# Patient Record
Sex: FEMALE | Race: WHITE
[De-identification: names, ages, dates, MRNs, and addresses within clinical notes are randomized per-mention and may not be internally consistent; named-entity substitution may affect disease eponyms.]

---

## 2017-03-09 NOTE — HP
HISTORY AND PHYSICAL:

 

DATE OF ADMISSION:  03/10/17

 

ADMITTING DIAGNOSES:

1.  Left hydronephrosis.

2.  Left ureteropelvic junction calculus.

3.  Left renal calculus.

 

PLANNED PROCEDURE:  Left stent insertion (to be followed in the near future by 
lithotripsy).

 

SURGEON:  Dr. Allen.

 

ADMITTING HISTORY AND PHYSICAL:  Abhi Neff is a 67-year-old lady with a 
history of recurrent renal calculi.  She was recently evaluated for left flank 
pain and was noted to have left hydronephrosis secondary to a 6-mm calculus at 
the left ureteropelvic junction.  In addition, she has a 9-mm calculus in the 
left kidney. The plan is for left stent insertion to be followed in the near 
future by lithotripsy.  Her urinalysis when seen on March 8th was suspicious 
for urinary tract infection and she was started on antibiotics and urine 
culture is pending at the time of this dictation.

 

PAST MEDICAL HISTORY:  Significant for:

1.  Renal calculi.

2.  Hypertension.

3.  Postherpetic neuralgia.

 

MEDICATIONS:  On admission:

1.  Hydrochlorothiazide 25 mg a day.

2.  Atenolol 12.5 mg a day.

3.  Lyrica 100 mg 2 tablets daily.

4.  Duloxetine 60 mg a day.

 

ALLERGIES:  AMPICILLIN (hives), OXCARBAZEPINE (skin rash), CODEINE (vomiting).

 

                               PHYSICAL EXAMINATION

 

GENERAL:  Reveals a pleasant, middle-aged lady.

 

VITAL SIGNS:  Blood pressure is 112/78, pulse 70 per minute, temperature 98.5, 
oxygen saturation 97%.

 

LUNGS:  Clear bilaterally.

 

CARDIOVASCULAR:  Regular rate and rhythm.  S1, S2.

 

ABDOMEN:  Soft with mild left flank tenderness.

 

 IMPRESSION:  A 67-year-old lady with left ureteropelvic junction calculus with 
mild left hydronephrosis and a possible associated urinary tract infection, who 
has been started on antibiotics and is now being brought in for left stent 
insertion to be followed at some point in the near future by lithotripsy.

 

 CC:  Dr. Joe Riggs; Dr. Allen *

 

[id number]/CPS #: [id number]

MTDD

## 2017-03-10 ENCOUNTER — HOSPITAL ENCOUNTER (OUTPATIENT)
Dept: HOSPITAL 25 - OR | Age: 67
Discharge: HOME | End: 2017-03-10
Attending: UROLOGY
Payer: MEDICARE

## 2017-03-10 VITALS — DIASTOLIC BLOOD PRESSURE: 77 MMHG | SYSTOLIC BLOOD PRESSURE: 126 MMHG

## 2017-03-10 DIAGNOSIS — B02.29: ICD-10-CM

## 2017-03-10 DIAGNOSIS — I10: ICD-10-CM

## 2017-03-10 DIAGNOSIS — N13.2: Primary | ICD-10-CM

## 2017-03-10 PROCEDURE — 87086 URINE CULTURE/COLONY COUNT: CPT

## 2017-03-10 PROCEDURE — C1876 STENT, NON-COA/NON-COV W/DEL: HCPCS

## 2017-03-10 PROCEDURE — 74420 UROGRAPHY RTRGR +-KUB: CPT

## 2017-03-10 NOTE — RAD
INDICATION: Left-sided hydronephrosis, left ureteral stent insertion



COMPARISONS: KUB dated March 08, 2017



TECHNIQUE: Fluoroscopy was provided for a retrograde pyelogram and stent placement. Total

fluoroscopy time is: 10 seconds



FINDINGS: Spot images demonstrate contrast within the renal collecting system. A ureteral

stent is noted



IMPRESSION: FLUOROSCOPY WAS PROVIDED FOR A RETROGRADE PYELOGRAM AND STENT PLACEMENT



CPT II Codes: 6045F

## 2017-03-11 NOTE — OP
DATE OF OPERATION:  03/10/17 - [facility]

 

DATE OF BIRTH:  [birth year] - AGE/SEX:  67 years, female.

 

SURGEON:  Dr. Allen.

 

ANESTHESIA:  Spinal.

 

ANESTHESIOLOGIST:  Dr. Chung.

 

PRE-OP DIAGNOSES:

1.  Left hydronephrosis.

2.  Calculus, left ureteropelvic junction.

3.  Urinary tract infection.

 

POST-OP DIAGNOSES:

1.  Left hydronephrosis.

2.  Calculus, left ureteropelvic junction.

3.  Urinary tract infection.

 

OPERATIVE PROCEDURE:  Cystoscopy, left retrograde pyelogram, left ureteral 
calculus manipulation and left stent insertion.

 

COMPLICATIONS:  None.

 

STENT USED:  8-Beninese stent, left ureter.

 

POSTOPERATIVE CONDITION:  Stable.

 

OPERATIVE FINDINGS:  Mild stricture at the area of left ureteropelvic junction 
with mild left hydronephrosis.

 

INDICATIONS:  Abhi Neff is a 67-year-old lady with a history of recurrent 
left renal calculi.  She was recently evaluated for left-sided pain and noted 
to have a 6 mm calculus at the left ureteropelvic junction with left 
hydronephrosis and then associated urinary tract infection.  She is being 
brought in for left stent insertion to be followed in near future by 
lithotripsy.

 

DESCRIPTION OF PROCEDURE:  After induction of spinal anesthesia, the patient 
was placed in dorsal lithotomy position.  Sequential compression devices were 
in place and functioning.  Initial cystoscopy revealed a normal-appearing 
bladder with some mild chronic inflammatory changes noted.  Left retrograde 
pyelogram revealed mild-to-moderate fullness on the left collecting system with 
some narrowing noted at the area of the ureteropelvic junction.  The open-ended 
catheter was advanced and calculus was manipulated proximally.  Once this was 
done, an 8-Beninese stent was introduced and positioned under fluoroscopy with 
good proximal and distal positioning obtained.  Urine had been obtained from 
the left kidney and sent for culture and visibly looked clear.  The bladder was 
emptied.  The patient tolerated the procedure satisfactorily and was 
transferred back to the recovery area in stable condition.



CC:  Joe Riggs MD; Yung Allen MD *

 

 [id number]/CPS #: [id number]

MTDD

## 2017-03-13 ENCOUNTER — HOSPITAL ENCOUNTER (OUTPATIENT)
Dept: HOSPITAL 25 - OR | Age: 67
Discharge: HOME | End: 2017-03-13
Attending: UROLOGY
Payer: MEDICARE

## 2017-03-13 VITALS — DIASTOLIC BLOOD PRESSURE: 62 MMHG | SYSTOLIC BLOOD PRESSURE: 117 MMHG

## 2017-03-13 DIAGNOSIS — F32.9: ICD-10-CM

## 2017-03-13 DIAGNOSIS — N20.0: Primary | ICD-10-CM

## 2017-03-13 DIAGNOSIS — M19.90: ICD-10-CM

## 2017-03-13 DIAGNOSIS — Z88.5: ICD-10-CM

## 2017-03-13 DIAGNOSIS — Z87.442: ICD-10-CM

## 2017-03-13 DIAGNOSIS — I27.2: ICD-10-CM

## 2017-03-13 DIAGNOSIS — I10: ICD-10-CM

## 2017-03-13 DIAGNOSIS — Z88.8: ICD-10-CM

## 2017-03-13 DIAGNOSIS — Z88.0: ICD-10-CM

## 2017-03-13 PROCEDURE — 74000: CPT

## 2017-03-13 NOTE — RAD
Indication indication: Lithotripsy.



2 views of the abdomen demonstrates a left ureteral stent in place. Tiny calcifications

overlying the lower pole of the left kidney is noted. The calculi appear to BE smaller

than on previous exam of March 08, 2017.



IMPRESSION: Left ureteral stent in place.

## 2017-03-14 NOTE — OP
DATE OF OPERATION:  03/13/17 - [facility]

 

DATE OF BIRTH:  [birth year]

 

SURGEON:  Yung Allen MD

 

ANESTHESIOLOGIST:  Dr. Elmore.

 

ANESTHESIA:  Intravenous sedation.

 

PRE-OP DIAGNOSIS:  Left renal calculi.

 

POST-OP DIAGNOSIS:  Left renal calculi.

 

OPERATIVE PROCEDURE:  Shock wave lithotripsy of left renal calculi.

 

INDICATIONS:  Abhi Neff is a 67-year-old lady with a history of recurrent 
renal calculi.  She had undergone urgent stent insertion for an obstructing 
calculus at the left ureteropelvic junction and is now being brought in for 
lithotripsy.

 

COMPLICATIONS:  None.

 

POSTOPERATIVE CONDITION:  Stable.

 

DESCRIPTION OF PROCEDURE:  After induction of intravenous sedation, the patient 
was placed on the lithotripsy table in supine position.  The calculi which were 
now in the lower pole area of the left kidney were visualized using 
fluoroscopy.  Shock wave lithotripsy was commenced at a rate of 90 shocks per 
minute.  After the initial 300 shocks, there was a brief pause in lithotripsy 
for several minutes in an effort to minimize any potential trauma to the 
kidney.  Lithotripsy was then resumed and a total of 2200 shocks were 
administered.  The patient tolerated the procedure satisfactorily and was 
transferred back to the recovery area in stable condition.



CC:  Joe Riggs MD *

 

 [id number]/CPS #: [id number]

MARYBEL

## 2017-08-01 ENCOUNTER — HOSPITAL ENCOUNTER (EMERGENCY)
Dept: HOSPITAL 25 - UCEAST | Age: 67
Discharge: HOME | End: 2017-08-01
Payer: MEDICARE

## 2017-08-01 VITALS — DIASTOLIC BLOOD PRESSURE: 72 MMHG | SYSTOLIC BLOOD PRESSURE: 154 MMHG

## 2017-08-01 DIAGNOSIS — Z88.1: ICD-10-CM

## 2017-08-01 DIAGNOSIS — Z90.49: ICD-10-CM

## 2017-08-01 DIAGNOSIS — Z88.5: ICD-10-CM

## 2017-08-01 DIAGNOSIS — I10: ICD-10-CM

## 2017-08-01 DIAGNOSIS — Z87.442: ICD-10-CM

## 2017-08-01 DIAGNOSIS — J06.9: Primary | ICD-10-CM

## 2017-08-01 PROCEDURE — G0463 HOSPITAL OUTPT CLINIC VISIT: HCPCS

## 2017-08-01 PROCEDURE — 87651 STREP A DNA AMP PROBE: CPT

## 2017-08-01 PROCEDURE — 99211 OFF/OP EST MAY X REQ PHY/QHP: CPT

## 2017-08-01 NOTE — UC
Throat Pain/Nasal Sunil HPI





- HPI Summary


HPI Summary: 





ST with swollen glands, extra sweating starting 5 days ago. Since yesterday has 

had a dry cough that feels like something is stuck low in her chest. Denies 

nasal congestion, trouble breathing, or measured fever. Is about to travel to 

[place] to help her sister who will be getting hip replacement.





- History of Current Complaint


Chief Complaint: UCGeneralIllness


Stated Complaint: SORE THROAT


Time Seen by Provider: 08/01/17 12:30


Hx Obtained From: Patient


Pregnant?: No


Onset/Duration: Gradual Onset, Lasting Days


Cough: Nonproductive


Associated Signs & Symptoms: Negative: Nasal Discharge, Fever, Vomiting, Rash





- Allergies/Home Medications


Allergies/Adverse Reactions: 


 Allergies











Allergy/AdvReac Type Severity Reaction Status Date / Time


 


Ampicillin Allergy  Hives Verified 08/01/17 12:26


 


Codeine Allergy  N/V Verified 08/01/17 12:26


 


Oxcarbazepine Allergy  RASH, SKIN Verified 08/01/17 12:26





   PEELING  


 


CARBAZEPINE Allergy  RASH AND Uncoded 08/01/17 12:26





   SKIN  





   PEELING  


 


Hayfever Allergy  Runny Nose Uncoded 08/01/17 12:26














PMH/Surg Hx/FS Hx/Imm Hx


Cardiovascular History: Hypertension





- Surgical History


Surgical History: Yes


Surgery Procedure, Year, and Place: KIDNEY STONE REMOVED, 2009, [place].  

GALLBLADDER [facility], 1974.  CYSTO STENTS, [facility] 7/2009, 8/2009, 4/2012 [facility].  11/20/

15 LITHO [facility]





- Family History


Known Family History: Positive: None





- Social History


Lives: Alone


Alcohol Use: None


Substance Use Type: None


Smoking Status (MU): Never Smoked Tobacco





Review of Systems


Constitutional: Negative


Skin: Negative


Eyes: Negative


ENT: Sore Throat


Respiratory: Cough


Cardiovascular: Negative


Gastrointestinal: Negative


Genitourinary: Negative


Motor: Negative


Neurovascular: Negative


Musculoskeletal: Negative


Neurological: Negative


Psychological: Negative


All Other Systems Reviewed And Are Negative: Yes





Physical Exam


Triage Information Reviewed: Yes


Appearance: Well-Appearing, No Pain Distress, Well-Nourished


Vital Signs: 


 Initial Vital Signs











Temp  96.2 F   08/01/17 12:21


 


Pulse  62   08/01/17 12:21


 


Resp  18   08/01/17 12:21


 


BP  154/72   08/01/17 12:21


 


Pulse Ox  99   08/01/17 12:21











Vital Signs Reviewed: Yes


Eye Exam: Normal, Other - PERRL


Eyes: Positive: Conjunctiva Clear


ENT: Positive: Hearing grossly normal, Pharynx normal, TMs normal.  Negative: 

Tonsillar swelling, Tonsillar exudate


Dental Exam: Normal


Neck: Positive: Supple, Tenderness @ - over anterior lymph nodes, no palpable 

swelling noted


Respiratory Exam: Normal


Respiratory: Positive: Chest non-tender, Lungs clear, Normal breath sounds, No 

respiratory distress, No accessory muscle use


Cardiovascular Exam: Normal


Cardiovascular: Positive: RRR, No Murmur


Musculoskeletal Exam: Normal


Neurological Exam: Normal


Neurological: Positive: Alert


Psychological Exam: Normal


Skin Exam: Normal





Throat Pain/Nasal Course/Dx





- Differential Dx/Diagnosis


Provider Diagnoses: URI, likely viral





Discharge





- Discharge Plan


Condition: Stable


Disposition: HOME


Patient Education Materials:  Cold Symptoms (ED)


Referrals: 


Joe Riggs MD [Primary Care Provider] - 


Additional Instructions: 


Remember to cover your cough, wash your hands frequently, and use hand 

 when entering or leaving your sister's hospital room. Have You Had Dysport Before?: has had dysport

## 2017-12-24 NOTE — HP
PREOPERATIVE HISTORY AND PHYSICAL:

 

DATE OF SURGERY/ADMISSION:  01/02/18

 

DATE OF OFFICE VISIT/ENCOUNTER:  12/20/17

 

ATTENDING SURGEON:  Niesha Garcia MD * (DICTATED BY BRIAN MONTENEGRO

 

PROCEDURE:  Right total knee replacement.

 

CHIEF COMPLAINT:  Right knee pain.

 

HISTORY OF PRESENT ILLNESS:  Ms. Neff is a 67-year-old female who has had 
ongoing of worsening right knee pain over several years increased with 
prolonged ambulation and stair climbing.  She has had physical therapy and also 
received a cortisone injection probably a month ago and was helpful; however, 
it has not been able to fully alleviate her discomfort and pain is interfering 
with her activities of daily life.  She has consented to proceed with surgical 
inter-vention at this time in the form of a right total knee replacement.  X-
rays of the knee had showed advanced osteoarthritis.

 

PAST MEDICAL HISTORY:

1.  Postherpetic neuralgia.

2.  Morbid obesity.

3.  Hypertension.

4.  History of asthma.

5.  Osteoarthritis.

6.  Heart murmur.

7.  History of kidney stones.

 

PAST SURGICAL HISTORY:

1.  Cholecystectomy.

2.  Several percutaneous lithotripsies for kidney stones.

3.  Hammertoe surgery.

 

CURRENT MEDICATIONS:

1.  Aspirin 325 mg 1 to 2 p.o. p.r.n. headaches.

2.  Atenolol 25 mg half tab p.o. every day.

3.  Claritin 10 mg daily p.r.n.

4.  Cymbalta 30 mg 2 capsules daily.

5.  Hydrochlorothiazide 25 mg daily.

6.  Ibuprofen 400 mg q.4 to 6 hours p.r.n.

7.  Lyrica 50 mg daily p.r.n.

8.  Lyrica 100 mg one tab in the evening and one tab in the morning.

 

ALLERGIES:  AMOXICILLIN, AMPICILLIN, CARBAMAZEPINE, OXCARBAZEPINE, all of those 
medications cause hives.  CODEINE PHOSPHATE causes nausea and vomiting.  The 
patient also has hay fever.

 

FAMILY MEDICAL HISTORY:  Heart disease, hypertension, and breast cancer.

 

SOCIAL HISTORY:  The patient is employed. She has a [employer] business and she 
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  She denies 
tobacco use and recreational drug use.  She does drink alcohol on rare 
occasions.

 

REVIEW OF SYSTEMS:  General:  Positive for night sweats.  Negative for fevers 
or chills.  No known anesthesia problems.  HEENT:  Positive for occasional 
blurred vision.  Negative for headache, lightheadedness, or syncopal episodes. 
Integumentary:  Negative for abrasions, lesions, or open wounds.  Cardiothoracic
: Positive for hypertension.  Negative for chest pain, palpitations, or edema. 
Positive for heart murmur.  Pulmonary:  Positive for shortness of breath with 
exertion related to asthma.  Negative for chronic cough or COPD.  GI:  Negative 
for nausea, vomiting, diarrhea, constipation, or GERD.  :  Positive for 
history of UTIs and positive for history of kidney stones.  Negative for 
nocturia, urinary frequency or urgency.  Musculoskeletal:  Positive for current 
complaint.  Negative for chronic or intermittent back pain or history of 
fractures.  Neurologic: Positive for postherpetic neuralgia.  Negative for 
history of seizure or stroke. Endocrine:  Negative for diabetes or thyroid 
issues.  Hematologic:  Negative for easy bruising, anemia, excessive bleeding, 
or history of DVT.  Infectious Disease: Negative for history of MRSA, hepatitis 
C, or HIV.

 

                               PHYSICAL EXAMINATION

 

GENERAL:  Well-developed, well-nourished 67-year-old female in no acute 
distress.

 

VITAL SIGNS:  Height 5 feet 3 inches, weight 236 pounds, blood pressure 180/90, 
pulse rate 78.

 

HEENT:  Normocephalic, atraumatic.  Pupils are equal, round and reactive to 
light and accommodation.  Extraocular movements are intact.  Throat is clear.

 

NECK:  Supple.  No palpable lymph nodes.

 

PULMONARY:  Lungs are clear to auscultation bilaterally.  No wheezes, rales, or 
rhonchi.

 

CARDIOVASCULAR:  Regular rate and rhythm.  S1 and S2.  A mild systolic ejection 
murmur noted.  No rubs or gallops.  No edema.

 

ABDOMEN:  Positive bowel sounds, soft and nontender.

 

NEUROLOGIC:  Alert and oriented x3.  Cranial nerves II through XII are intact. 
Sensation is intact to light touch.

 

MUSCULOSKELETAL:  On exam of her extremities and her knee in particular, skin 
is intact.  She has a mild joint effusion.  Range of motion from 5 to 110 
degrees of flexion at the knee.  5/5 ankle dorsiflexion and plantar flexion 
strength.  No varus or valgus instabilities of the knee.  Full sensation to 
light touch in all nerve distributions and a 2+ palpable dorsalis pedis pulse.

 

 IMAGING STUDIES:  X-rays of the right knee show severe arthritis with medial 
bone- on-bone contact, end-stage arthritis with tricompartmental osteophyte 
formation, subchondral sclerosis, and joint space narrowing.

 

IMPRESSION:  Right knee end-stage osteoarthritis.

 

PLAN:  This is a 67-year-old female with end-stage osteoarthritis of the right 
knee.  She has failed conservative treatment and has elected to proceed with a 
right total knee arthroplasty, which is scheduled for 01/02/18 with Dr. Garcia.  
Dr. Garcia discussed the risks and benefits of surgery at today's visit and all 
of her questions were answered.  Postoperatively, she will use Coumadin for DVT 
prophylaxis, Percocet for pain management and Colace for constipation related 
to narcotic pain medication use.  All of these prescriptions were e-scribed to 
the patient's pharmacy today.  She will follow up with Dr. Garcia in 2 weeks 
after surgery.

 

 ____________________________________ BRIAN MONTENEGRO

 

[id number]/[facility] #: [id number]

MARYBEL

## 2018-01-02 ENCOUNTER — HOSPITAL ENCOUNTER (INPATIENT)
Dept: HOSPITAL 25 - AA | Age: 68
LOS: 2 days | Discharge: HOME HEALTH | DRG: 470 | End: 2018-01-04
Attending: ORTHOPAEDIC SURGERY | Admitting: ORTHOPAEDIC SURGERY
Payer: MEDICARE

## 2018-01-02 DIAGNOSIS — I34.0: ICD-10-CM

## 2018-01-02 DIAGNOSIS — Z80.3: ICD-10-CM

## 2018-01-02 DIAGNOSIS — I10: ICD-10-CM

## 2018-01-02 DIAGNOSIS — M25.761: ICD-10-CM

## 2018-01-02 DIAGNOSIS — J45.909: ICD-10-CM

## 2018-01-02 DIAGNOSIS — Z90.49: ICD-10-CM

## 2018-01-02 DIAGNOSIS — Z87.442: ICD-10-CM

## 2018-01-02 DIAGNOSIS — Z88.8: ICD-10-CM

## 2018-01-02 DIAGNOSIS — Z82.49: ICD-10-CM

## 2018-01-02 DIAGNOSIS — Z88.6: ICD-10-CM

## 2018-01-02 DIAGNOSIS — Z88.1: ICD-10-CM

## 2018-01-02 DIAGNOSIS — Z72.89: ICD-10-CM

## 2018-01-02 DIAGNOSIS — M17.11: Primary | ICD-10-CM

## 2018-01-02 DIAGNOSIS — B02.29: ICD-10-CM

## 2018-01-02 DIAGNOSIS — E66.01: ICD-10-CM

## 2018-01-02 PROCEDURE — 85610 PROTHROMBIN TIME: CPT

## 2018-01-02 PROCEDURE — 88305 TISSUE EXAM BY PATHOLOGIST: CPT

## 2018-01-02 PROCEDURE — 86803 HEPATITIS C AB TEST: CPT

## 2018-01-02 PROCEDURE — 85049 AUTOMATED PLATELET COUNT: CPT

## 2018-01-02 PROCEDURE — 0SRC0J9 REPLACEMENT OF RIGHT KNEE JOINT WITH SYNTHETIC SUBSTITUTE, CEMENTED, OPEN APPROACH: ICD-10-PCS | Performed by: ORTHOPAEDIC SURGERY

## 2018-01-02 PROCEDURE — 85014 HEMATOCRIT: CPT

## 2018-01-02 PROCEDURE — 62323 NJX INTERLAMINAR LMBR/SAC: CPT

## 2018-01-02 PROCEDURE — 85018 HEMOGLOBIN: CPT

## 2018-01-02 PROCEDURE — 80048 BASIC METABOLIC PNL TOTAL CA: CPT

## 2018-01-02 PROCEDURE — 88311 DECALCIFY TISSUE: CPT

## 2018-01-02 PROCEDURE — 36415 COLL VENOUS BLD VENIPUNCTURE: CPT

## 2018-01-02 PROCEDURE — C1776 JOINT DEVICE (IMPLANTABLE): HCPCS

## 2018-01-02 RX ADMIN — Medication ONE APPLIC: at 08:29

## 2018-01-02 RX ADMIN — ROPIVACAINE HYDROCHLORIDE SCH: 5 INJECTION, SOLUTION EPIDURAL; INFILTRATION; PERINEURAL at 17:16

## 2018-01-02 RX ADMIN — DOCUSATE SODIUM SCH MG: 100 CAPSULE, LIQUID FILLED ORAL at 21:39

## 2018-01-02 RX ADMIN — Medication ONE: at 19:29

## 2018-01-02 RX ADMIN — OXYCODONE HYDROCHLORIDE AND ACETAMINOPHEN PRN TAB: 5; 325 TABLET ORAL at 21:39

## 2018-01-02 NOTE — RAD
Indication: Right knee replacement.



2 views of the right knee demonstrates bipolar right knee arthroplasty in satisfactory

position.



IMPRESSION: Right knee arthroplasty in satisfactory position.

## 2018-01-02 NOTE — XMS REPORT
Abhi Vu

 Created on:2017



Patient:Abhi Vu

Sex:Female

:1950

External Reference #:[id number]





Demographics







 Address  [address]



   [place] [address]

 

 Home Phone  [phone]

 

 Mobile Phone  [phone]

 

 Email Address  [email or web address]

 

 Preferred Language  English

 

 Marital Status  Not  Or 

 

 [employer] Affiliation  Unknown

 

 Race  White

 

 Ethnic Group  Not  Or 









Author







 Organization  [facility]

 

 Address  [address] [address]

 

 Phone  [phone]









Support







 Name  Relationship  Address  Phone

 

 Amari Vu  Unavailable  Unavailable  [phone]









Care Team Providers







 Name  Role  Phone

 

 Celia Linn MD  Primary Care Physician  Unavailable









Payers







 Type  Date  Identification Numbers  Payment Provider  Subscriber

 

 Health Maintenance    Policy Number:  Medicare Blue Ppo  Abhi Vu



 [facility] (O)    [id number]    









 Group Number: [id number]  [address] 

 

 PayID:   SHAYY [place] [id number]









 Medigap Part B  Effective:  Policy Number:  BS Aster  Abhi WHITMORE Mcpmonicalucia



   2014  [id number]    









 Expires: 2015  PayID: [id number]   [address] [id number]









 SHAYY [place] [id number]









 Medigap Part B  Effective:  Policy Number:  BS Aster  Abhi WHITMORE Patricialucia



   2011  [id number]    









 Expires: 2013  PayID: [id number]  [place] [id number]









 SHAY[place] [id number]







Problems







 Date  Description  Provider  Status

 

 Onset: 2012  Essential hypertension  Qutaybeh S. Maghaydah, Active M.D.  

 

 Onset: 2012  Mitral valve disorder  Qutaybeh S. Maghaydah, Active M.D.  

 

 Onset: 2012  Tricuspid valve disorder,  Qutaybeh S. Maghaydah, Active



   non-rheumatic  M.DBhaskar  

 

 Onset: 2013  Electrocardiogram abnormal  Qutaybeh S. Maghaydah, Active M.D.  

 

 Onset: 2013  Chest pain  Qutaybeh S. Maghaydah,  Active



     M.D.  

 

 Onset: 2013  Right bundle branch block AND left  Qutaybeh S. Maghaydah,  
Active



   posterior fascicular block  M.D.  

 

 Onset: 01/10/2014  Heart murmur  [facility]  Active

 

 Onset: 2016  Post-herpetic trigeminal neuralgia  Velvet Hanson M.D.  
Active

 

 Onset: 2017  Localized, primary osteoarthritis  Niesha Garcia M.D.  
Active



   of the pelvic region and thigh    

 

 Onset: 2017  Localized, primary osteoarthritis  Niesha Garcia M.D.  
Active







Family History







 Date  Family Member(s)  Problem(s)  Comments

 

   General  Heart Disease  

 

   General  Hypertension  

 

   General  Cancer  

 

   Father   due to MI x3  () - age 51 yr

 

   Mother   due to Natural Causes  ()

 

   Paternal Grandfather   due to influenza  ()

 

   Paternal Grandmother   due to Influenza  ()

 

   Maternal Grandfather   due to Lung disease  ()

 

   Maternal Grandmother   due to drowned  ()







Social History







 Type  Date  Description  Comments

 

 Marital Status      

 

 Lives With    Spouse  

 

 Occupation    [employer]  

 

 Cigarette Use    Never Smoked Cigarettes  

 

 ETOH Use    Rarely consumes alcohol  

 

 Smoking    Patient has never smoked  

 

 Recreational Drug Use    Denies Drug Use  

 

 Daily Caffeine    Consumes on average 2 cups of  and ice tea



     hot tea per day  

 

 Enjoy Exercising    Enjoys exercising  Walking, water aerobics

 

 Exercise Type/Frequency    Walks daily  85390 steps per day

 

 Exercise Type/Frequency    Swims 2 times a week  Aqua deonna







Allergies, Adverse Reactions, Alerts







 Date  Description  Reaction  Status  Severity  Comments

 

 2009  Ampicillin    active    hives

 

 2009  Codeine Phosphate    active    N/V

 

 2009  Hay Fever    active    

 

 2009  Oxcarbazepine    active    rash

 

 2009  Carbamazepine    active    rash

 

 2017  Amoxicillin    active    







Medications







 Medication  Date  Status  Form  Strength  Qnty  SIG  Indications  Ordering



                 Provider

 

 Lyrica    Active  Capsules  50mg  30cap  1 tab by    Lauren        s  mouth    Cowdery,



             daily as    M.D.



             needed    

 

 Hydrochlorothiazid    Active  Tablets  25mg  90tab  Take One  I10  
Seantaybeh



         s  Tablet By    S.



             Mouth    Jose,



             Every Day    M.D.

 

 Cymbalta    Active  Caps DR  30mg  60cap  Take Two  B02.22  Velvet     Part    s  Capsules    Stackman,



             By Mouth    M.D.



             Every Day    



             as    



             Directed    

 

 Atenolol    Active  Tablets  25mg  30tab  1/2  po    Unknown



   /        s  qd    

 

 Lyrica    Active  Capsules  100mg  60cap  1 by    Lauren



   /        s  mouth    Cowdery,



             daily in    M.D.



             the    



             evening    



             and one    



             in the    



             morning    

 

 Ibuprofen    Active  Tablets  400mg    by mouth    Unknown



   /0000          every 4    



             to 6    



             hours as    



             needed    

 

 Claritin    Active  Capsules  10mg    1 by    Unknown



   /0000          mouth    



             every day    



             as needed    

 

 Aspirin    Active    325mg    1-2 po    Unknown



   /          prn    



             headache    



             (rare    



             use)    

 

                 

 

 Lyrica    Hx  Capsules  100mg  60cap  one twice    Velvet HERRERA



           s  a day    Margie,



   -              M.D.



                 

 

 Lyrica    Hx  Capsules  50mg  60cap  1 by    Velvet Bhaskar



           s  mouth per    Margie,



   -          day as    M.D.



             needed in    



   /2016          addition    



             to 100mg    



             tabs    

 

 Hydrochlorothiazid    Hx  Tablets  25mg  90tab  1 po qd    Other



         s      Ordering



   -              Provider



                 

 

 Cozaar    Hx  Tablets  50mg  60tab  1 pill by    Kailey        s  mouth    Hadley,



   -          twice per    D.O.



   11/27          day    



   /2013              

 

 Lisinopril    Hx  Tablets  5mg  30tab  1 po qd    Qutaybeh /2011        s      S.



   [facility]              [facility]



                 [facility]



                 

 

 Norvasc    Hx  Tablets  10mg  90tab  1 po qd    Qutayb        s      S.



   -              Maghaydah,



                 M.D.



                 

 

 Zyrtec    Hx  Chewtabs  10mg  30uni  1 tab po    Qutayb        ts  qd    S.



   -              Maghaydah,



                 M.D.



                 

 

 Norvasc    Hx  Tablets  5mg  30tab  1 po qd    Qutayb        s      S.



   -              Maghaydah,



                 M.D.



                 

 

 Lyrica    Hx  Capsules  150mg  60cap  1 po bid    Unknown



   /0000        s  and 1 qhs    



   -              



                 

 

 Multi Vitamin    Hx  Tablets      1 po qd    Unknown



   /0000              



   -              



                 

 

 Fish Oil  00/  Hx  Capsules  1000mg    1 po qd    Unknown



   /0000              



   -              



                 

 

 Lyrica    Hx  Capsules  150mg    1 po    Unknown



   /0000          daily in    



   -          the    



   12/29          morning    



   /2016              

 

 Cipro    Hx  Tablets  500mg  20tab  1 po qd x    Unknown



   /0000        s  3 days    



   -              



   04/10              



   /2012              

 

 Vitamin B6  /  Hx  Tablets  250mg    2 po qd    Unknown



   /              



   -              



                 

 

 Magnesium  /  Hx        1 spray    Unknown



   /          qd on    



   -          abdomen    



                 

 

 Aspir-81  /  Hx  Tablets DR  81mg    1 by    Unknown



   /0000          mouth    



   -          every day    



                 

 

 Ciprofloxacin HCL    Hx  Tablets  500mg        Husseini,



   /0000              MD García



   -              



   10/31              



   /2017              

 

 Duloxetine HCL    Hx  Caps DR  30mg    2 by    Unknown



   /0000    Part      mouth    



   -          every day    



                 

 

 Pneumococcal,Unspe    Active  Injection          Unknown



 cified                

 

 Influenza Virus    Active  Injection          Velvet HERRERA



 Vaccine  /0000              ISABELA Hanson







Medications Administered in Office







 Medication  Date  Status  Form  Strength  Qnty  SIG  Indications  Ordering



                 Provider

 

 Depomedrol    Administered  Injection          Niesha



 40MG  Chucho Garcia M.D.

 

 Depomedrol    Administered  Injection          Niesha



 40MG  Chucho Garcia M.D.







Vital Signs







 Date  Vital  Result  Comment

 

 2017  Height  63 inches  5'3"









 Weight  236.00 lb  

 

 Heart Rate  78 /min  

 

 BP Systolic Sitting  180 mmHg  

 

 BP Diastolic Sitting  90 mmHg  

 

 Body Temperature  98.5 F  

 

 BMI (Body Mass Index)  41.8 kg/m2  









 2017  Height  63 inches  5'3"









 Weight  228.00 lb  

 

 Heart Rate  72 /min  

 

 BP Systolic  138 mmHg  

 

 BP Diastolic  82 mmHg  

 

 Respiratory Rate  16 /min  

 

 Body Temperature  98.5 F  

 

 BMI (Body Mass Index)  40.4 kg/m2  









 2017  Height  63 inches  5'3"









 Weight  228.00 lb  with shoes

 

 Heart Rate  58 /min  

 

 BP Systolic Sitting  166 mmHg  LA lrg cuff

 

 BP Diastolic Sitting  104 mmHg  LA lrg cuff

 

 BMI (Body Mass Index)  40.4 kg/m2  

 

 Ejection Fraction  60%-65%  echo 01/15/13









 2017  Height  63 inches  5'3"









 Weight  225.00 lb  

 

 Heart Rate  61 /min  

 

 BP Systolic  147 mmHg  

 

 BP Diastolic  73 mmHg  

 

 BMI (Body Mass Index)  39.9 kg/m2  









 09/15/2017  Height  63 inches  5'3"









 Weight  227.00 lb  w/ shoes

 

 Heart Rate  66 /min  reg

 

 BP Systolic Sitting  150 mmHg  Rue, lg cuff

 

 BP Diastolic Sitting  94 mmHg  Rue, lg cuff

 

 Respiratory Rate  16 /min  

 

 BMI (Body Mass Index)  40.2 kg/m2  

 

 Ejection Fraction  60-65%  as of 2013 echo









 2017  Height  63 inches  5'3"









 Weight  230.00 lb  

 

 Heart Rate  68 /min  

 

 BP Systolic  156 mmHg  

 

 BP Diastolic  84 mmHg  

 

 Respiratory Rate  16 /min  

 

 Pain Level  2  

 

 BMI (Body Mass Index)  40.7 kg/m2  









 2017  Height  63 inches  5'3"









 Weight  217.00 lb  

 

 Heart Rate  66 /min  

 

 BP Systolic  142 mmHg  

 

 BP Diastolic  86 mmHg  

 

 Respiratory Rate  18 /min  

 

 Body Temperature  98.4 F  

 

 BMI (Body Mass Index)  38.4 kg/m2  









 12/15/2016  Heart Rate  61 /min  









 BP Systolic  132 mmHg  LA, large

 

 BP Diastolic  80 mmHg  LA, large

 

 BP Systolic Sitting  152 mmHg  LA, home unit

 

 BP Diastolic Sitting  78 mmHg  LA, home unit









 2016  Height  64 inches  5'4"









 Weight  222.00 lb  without shoes

 

 Heart Rate  76 /min  

 

 BP Systolic Sitting  160 mmHg  Rue reg cuff

 

 BP Diastolic Sitting  86 mmHg  Rue reg cuff

 

 BP Systolic Standing  156 mmHg  Rue reg cuff

 

 BP Diastolic Standing  80 mmHg  Rue reg cuff

 

 Respiratory Rate  17 /min  

 

 BMI (Body Mass Index)  38.1 kg/m2  









 11/10/2016  Height  64 inches  5'4"









 Weight  218.00 lb  

 

 Heart Rate  56 /min  

 

 BP Systolic Sitting  132 mmHg  

 

 BP Diastolic Sitting  86 mmHg  

 

 Respiratory Rate  14 /min  

 

 BMI (Body Mass Index)  37.4 kg/m2  









 2016  Height  64 inches  5'4"









 Weight  220.00 lb  

 

 Heart Rate  56 /min  

 

 BP Systolic Sitting  108 mmHg  

 

 BP Diastolic Sitting  70 mmHg  

 

 Respiratory Rate  14 /min  

 

 BMI (Body Mass Index)  37.8 kg/m2  









 2016  Height  64 inches  5'4"









 Weight  221.00 lb  w/o shoes

 

 Heart Rate  60 /min  

 

 BP Systolic Sitting  118 mmHg  LA lg cuff

 

 BP Diastolic Sitting  90 mmHg  LA lg cuff

 

 BMI (Body Mass Index)  37.9 kg/m2  

 

 Ejection Fraction  60-65  echo 1/15/13









 2015  Height  64 inches  5'4"









 Weight  237.00 lb  

 

 Heart Rate  70 /min  

 

 BP Systolic  146 mmHg  LA large

 

 BP Diastolic  78 mmHg  LA large

 

 BMI (Body Mass Index)  40.7 kg/m2  

 

 Ejection Fraction  60-65%  1/15/13 ECHO









 2014  Height  64 inches  5'4"









 Weight  240.25 lb  

 

 Heart Rate  64 /min  

 

 BP Systolic Sitting  144 mmHg  LA lg cuff

 

 BP Diastolic Sitting  82 mmHg  LA lg cuff

 

 Respiratory Rate  16 /min  

 

 BMI (Body Mass Index)  41.2 kg/m2  









 2013  Height  64 inches  5'4"









 Weight  228.00 lb  

 

 Heart Rate  68 /min  

 

 BP Systolic Sitting  130 mmHg  

 

 BP Diastolic Sitting  82 mmHg  

 

 Respiratory Rate  16 /min  

 

 BMI (Body Mass Index)  39.1 kg/m2  









 2013  Height  64 inches  5'4"









 Weight  247.00 lb  

 

 Heart Rate  58 /min  

 

 BP Systolic  118 mmHg  

 

 BP Diastolic  68 mmHg  

 

 BMI (Body Mass Index)  42.4 kg/m2  









 2012  Height  64 inches  5'4"









 Weight  260.00 lb  

 

 Heart Rate  70 /min  

 

 BP Systolic  128 mmHg  

 

 BP Diastolic  70 mmHg  

 

 BP Systolic Sitting  108 mmHg  pulse 80

 

 BP Diastolic Sitting  62 mmHg  pulse 80

 

 BP Systolic Standing  118 mmHg  pulse 84

 

 BP Diastolic Standing  70 mmHg  pulse 84

 

 BP Systolic Lying Down  116 mmHg  pulse 64

 

 BP Diastolic Lying Down  60 mmHg  pulse 64

 

 BMI (Body Mass Index)  44.6 kg/m2  









 04/10/2012  Height  64 inches  5'4"









 Weight  259.75 lb  

 

 Heart Rate  65 /min  

 

 BP Systolic Sitting  160 mmHg  home unit: 161/94

 

 BP Diastolic Sitting  80 mmHg  home unit: 161/94

 

 BMI (Body Mass Index)  44.6 kg/m2  









 2012  Height  64 inches  5'4"









 Weight  261.00 lb  

 

 Heart Rate  49 /min  

 

 BP Systolic Sitting  152 mmHg  

 

 BP Diastolic Sitting  90 mmHg  

 

 BMI (Body Mass Index)  44.8 kg/m2  









 2011  Height  64 inches  5'4"









 Weight  265.00 lb  

 

 Heart Rate  60 /min  

 

 BP Systolic Sitting  164 mmHg  L

 

 BP Diastolic Sitting  80 mmHg  L

 

 BMI (Body Mass Index)  45.5 kg/m2  









 2009  Height  64 inches  5'4"









 Weight  259.00 lb  

 

 Heart Rate  60 /min  

 

 BP Systolic Sitting  138 mmHg  

 

 BP Diastolic Sitting  82 mmHg  

 

 BMI (Body Mass Index)  44.5 kg/m2  









 2009  Height  64 inches  5'4"









 Weight  257.00 lb  

 

 Heart Rate  60 /min  

 

 BP Systolic Sitting  158 mmHg  L

 

 BP Diastolic Sitting  70 mmHg  L

 

 BMI (Body Mass Index)  44.1 kg/m2  









 2009  Height  64 inches  5'4"









 Weight  263.00 lb  

 

 Heart Rate  53 /min  

 

 BP Systolic Sitting  130 mmHg  

 

 BP Diastolic Sitting  80 mmHg  

 

 BP Systolic Standing  140 mmHg  

 

 BP Diastolic Standing  90 mmHg  

 

 BP Systolic Lying Down  130 mmHg  

 

 BP Diastolic Lying Down  80 mmHg  

 

 Respiratory Rate  16 /min  

 

 BMI (Body Mass Index)  45.1 kg/m2  







Results







 Test  Date  Test  Result  H/L  Range  Note

 

 Basic Metabolic Panel  2012  Sodium  136 mmol/L    135-145  









 Potassium  4.5 mmol/L    3.5-5.0  

 

 Chloride  106 mmol/L    101-111  

 

 Co2 (Carbon Dioxide)  24.0 mmol/L    22-32  

 

 Anion Gap  6.0 mmol/L    2-11  1

 

 Glucose  95 mg/dL      

 

 BUN  23 mg/dL    6-24  

 

 Creatinine  0.8 mg/dL    0.50-1.40  

 

 One Over Creatinine  1.25      

 

 BUN/Creatinine Ratio  28.8  High  8-20  

 

 Calcium  8.8 mg/dL    8.1-9.9  

 

 eGFR Non-  72.9    &gt; 60  

 

 eGFR   93.8    &gt; 60  2









 1  Anion gap measurement may be of limited value in the



   presence of any alkalosis, especially in a combined acid



   base disorder.



   .

 

 2  *******Because ethnic data is not always readily available,



   this report includes an eGFR for both -Americans and



   non- Americans.****



   The National Kidney Disease Education Program (NKDEP) does



   not endorse the use of the MDRD equation for patients that



   are not between the ages of 18 and 70, are pregnant, have



   extremes of body size, muscle mass, or nutritional status,



   or are non- or non-.



   According to the National Kidney Foundation, irrespective of



   diagnosis, the stage of the disease is based on the level of



   kidney function:



   Stage Description                      GFR(mL/min/1.73 m(2))



   1     Kidney damage with normal or decreased GFR       90



   2     Kidney damage with mild decrease in GFR          60-89



   3     Moderate decrease in GFR                         30-59



   4     Severe decrease in GFR                           15-29



   5     Kidney failure                       &lt;15 (or dialysis)







Procedures







 Date  CPT Code  Description  Status

 

 2017  61062  ECHO Transthoracic, Real-Time 2D With Doppler And Color  
Completed



     Flow  

 

 2017  45051  ECHO Transthoracic, Real-Time 2D With Doppler And Color  
Completed



     Flow  

 

 11/10/2017    Mammogram  Completed

 

 201710  Inject/Drain Joint/Bursa Major  Completed

 

 201710  Inject/Drain Joint/Bursa Major  Completed

 

 09/15/2017  99655  EKG Tracing &amp; Interpretation  Completed

 

 2016  23563  EKG Tracing &amp; Interpretation  Completed

 

 2016    Mammogram  Completed

 

 03/15/2016    Mammogram  Completed

 

 2016  59053  EKG Tracing &amp; Interpretation  Completed

 

 2015    Mammogram  Completed

 

 2015  51938  EKG Tracing &amp; Interpretation  Completed

 

 2015    Mammogram  Completed

 

 2015    Mammogram  Completed

 

 2015    Mammogram  Completed

 

 2014  93604  EKG Tracing &amp; Interpretation  Completed

 

 01/10/2014  38119  ECHO Transthoracic, Real-Time 2D With Doppler And Color  
Completed



     Flow  

 

 2013  81515  EKG Tracing &amp; Interpretation  Completed

 

 2013  94983  ECHO Transthoracic, Real-Time 2D With Doppler And Color  
Completed



     Flow  

 

 2013  02122  EKG Tracing &amp; Interpretation  Completed

 

 01/15/2013  52905  ECHO Transthoracic, Real-Time 2D With Doppler And Color  
Completed



     Flow  

 

 2013  03564  ECHO Stress Test Incl Perf Contiuous ekg Monitoring  
Completed



     W/Phys Superv  

 

 2013  51575  ECHO Transthoracic, Real-Time 2D With Doppler And Color  
Completed



     Flow  

 

 2012  40978  EKG Tracing &amp; Interpretation  Completed

 

 2012  92345  ECHO Transthoracic, Real-Time 2D With Doppler And Color  
Completed



     Flow  

 

 2011  09648  EKG Tracing &amp; Interpretation  Completed

 

 10/04/2010  72138  ECHO Transthoracic, Real-Time 2D With Doppler And Color  
Completed



     Flow  

 

 10/06/2009  31241  ECHO Stress Test Incl Perf Contiuous ekg Monitoring  
Completed



     W/Phys Superv  

 

 10/06/2009  72867  ECHO Stress Test Incl Perf Contiuous ekg Monitoring  
Completed



     W/Phys Superv  

 

 2009  83632  ECHO Transthoracic, Real-Time 2D With Doppler And Color  
Completed



     Flow  

 

 2009  72890  EKG Tracing &amp; Interpretation  Completed

 

 2009  87243  EKG, Interpretation Only  Completed

 

 2006  81894  Color Doppler  Completed

 

 2006  72591  Pulse Doppler &amp; Continuous Wave  Completed

 

 2006  20553  Echocardiogram  Completed







Encounters







 Type  Date  Location  Provider  CPT E/M  Dx

 

 Office Visit  2017  [facility]  Amelia Saini MD  32499  
N64.59



   10:00a  [facility]      

 

 Office Visit  2017  [facility]  Qutaybeh S.  [address]90  I10



   11:40a    ISABELA Garcia    









 I34.0

 

 I36.1









 Office Visit  09/15/2017  2:00p  [facility]  Qutaybeh S. haydah,  
75100  I10



       M.D.    









 I34.0

 

 I36.1

 

 I45.10









 Office Visit  2017  3:15p  [facility]  Velvet Hanson,  33502  
B02.22



     [facility]  M.D.    

 

 Office Visit  2017 10:00a  [facility]  Amelia Saini MD  
[address]  N64.59



     [facility]      

 

 Office Visit  12/15/2016  9:30a  [facility]  Nurse Visit   09685  I10

 

 Office Visit  2016  4:20p  [facility]  Qutaybdora S.  [address]69  
I34.0



     Trae Garcia M.D.    









 I45.10

 

 I10

 

 I36.1

 

 E66.9









 Office Visit  11/10/2016  9:45a  [facility]  Velvet Hanson,  80298  
B02.22



     [facility]  M.DBhaskar    

 

 Office Visit  2016  9:45a  [facility]  Velvet Hanson,  55347  
B02.22



     [facility]  M.D.    

 

 Office Visit  2016  1:40p  [facility]  Qutaybeh S.  06086  I34.0



       ISABELA Garcia    









 I45.2

 

 I10

 

 R94.31









 Office Visit  2015  3:40p  [facility]  Qutaybeh S. Maghaydah,  
11277  424.0



       M.DBhaskar    









 426.51

 

 401.9

 

 794.31









 Office Visit  2014  2:40p  [facility]  Qutaybeh S. haydah,  
08285  424.0



       M.DBhaskar    









 426.51

 

 401.9

 

 794.31









 Office Visit  2013 10:20a  [facility]  Qutaybeh S. Jose,  
22831  401.9



       M.D.    









 794.31

 

 424.0

 

 424.2

 

 426.51

 

 785.2









 Office Visit  2013  9:40a  [facility]  Seantaybdora S. Johnathanydah,  
71337  401.9



       M.D.    









 794.31

 

 424.0

 

 424.2

 

 426.51









 Office Visit  2013  3:30p  [facility]  Seantaybdora S. Faribaah,  
83523  401.9



       M.D.    









 794.31

 

 786.50









 Office Visit  2012  1:30p  [facility]  Lauren Valenzuela, N.P.  
93100  401.9

 

 Office Visit  04/10/2012 10:00a  [facility]  Lauren Bianca, N.P.  
46946  401.9

 

 Office Visit  2012 10:00a  [facility]  Qutaybeh S. Jose,  
47016  401.9



       M.D.    









 424.0

 

 794.31

 

 424.2









 Office Visit  2011  8:15a  [facility]  Nurse Visit cc  50498  
401.9

 

 Office Visit  2011  3:40p  [facility]  Seantaybdora S. Johnathanydah,  
96844  401.9



       M.D.    









 424.0

 

 794.31









 Office Visit  2009 10:30a  [facility]  Nurse Visit cc  19059  

 

 Office Visit  2009  9:20a  [facility]  Seantaybdora S. Jose,  
01903  401.9



       M.D.    









 424.0

 

 424.2









 Office Visit  2009  2:20p  [facility]  Qutayb S. Jose,  
48824  401.9



       M.D.    









 785.2

 

 424.0

 

 424.2

 

 780.4

 

 V72.81

 

 794.31







Plan of Care

Future Appointment(s):2018 10:45 am - Niesha Garcia M.D. at [facility]2018  9:30 am - Niesha [facility] M.D. at [facility]2018 10:45 am - Velvet Hanson M.D. at [facility]2017 - Niesha Garcia M.D.M17.0 Bilateral 
primary osteoarthritis of kneeFollow up:Follow up:   2 weeks post o pM25.561 
Pain in right knee

## 2018-01-03 LAB
HCT VFR BLD AUTO: 31 % (ref 35–47)
HGB BLD-MCNC: 10.5 G/DL (ref 12–16)
INR PPP/BLD: 1.1 (ref 0.77–1.02)
PLATELET # BLD AUTO: 207 10^3/UL (ref 150–450)

## 2018-01-03 RX ADMIN — OXYCODONE HYDROCHLORIDE AND ACETAMINOPHEN PRN TAB: 5; 325 TABLET ORAL at 01:23

## 2018-01-03 RX ADMIN — ATENOLOL SCH MG: 25 TABLET ORAL at 08:46

## 2018-01-03 RX ADMIN — DOCUSATE SODIUM SCH MG: 100 CAPSULE, LIQUID FILLED ORAL at 08:47

## 2018-01-03 RX ADMIN — OXYCODONE HYDROCHLORIDE AND ACETAMINOPHEN PRN TAB: 5; 325 TABLET ORAL at 22:23

## 2018-01-03 RX ADMIN — ENOXAPARIN SODIUM SCH MG: 30 INJECTION SUBCUTANEOUS at 12:05

## 2018-01-03 RX ADMIN — OXYCODONE HYDROCHLORIDE PRN MG: 5 CAPSULE ORAL at 10:43

## 2018-01-03 RX ADMIN — DOCUSATE SODIUM SCH MG: 100 CAPSULE, LIQUID FILLED ORAL at 22:23

## 2018-01-03 RX ADMIN — OXYCODONE HYDROCHLORIDE AND ACETAMINOPHEN PRN TAB: 5; 325 TABLET ORAL at 12:55

## 2018-01-03 RX ADMIN — OXYCODONE HYDROCHLORIDE AND ACETAMINOPHEN PRN TAB: 5; 325 TABLET ORAL at 17:59

## 2018-01-03 RX ADMIN — MAGNESIUM HYDROXIDE PRN ML: 400 SUSPENSION ORAL at 18:01

## 2018-01-03 RX ADMIN — OXYCODONE HYDROCHLORIDE AND ACETAMINOPHEN PRN TAB: 5; 325 TABLET ORAL at 08:47

## 2018-01-03 RX ADMIN — DULOXETINE HYDROCHLORIDE SCH MG: 60 CAPSULE, DELAYED RELEASE ORAL at 07:22

## 2018-01-03 RX ADMIN — PREGABALIN SCH MG: 100 CAPSULE ORAL at 22:21

## 2018-01-03 RX ADMIN — PREGABALIN SCH MG: 100 CAPSULE ORAL at 07:22

## 2018-01-03 RX ADMIN — OXYCODONE HYDROCHLORIDE PRN MG: 5 CAPSULE ORAL at 15:00

## 2018-01-03 RX ADMIN — ROPIVACAINE HYDROCHLORIDE SCH: 5 INJECTION, SOLUTION EPIDURAL; INFILTRATION; PERINEURAL at 10:45

## 2018-01-03 RX ADMIN — HYDROCHLOROTHIAZIDE SCH MG: 25 TABLET ORAL at 08:46

## 2018-01-03 NOTE — PN
Progress Note





- Progress Note


Date of Service: 01/03/18


SOAP: 


Subjective:


Pt. is alert, c/o moderate pain r knee.  








Objective:


RLE - drain removed, tip intact, 400 cc ss drainage.  calf soft, distally +df/pf

, full sens lt, 2+ dp pulse.  





Vital Signs:











Temp Pulse Resp BP Pulse Ox


 


 98.4 F   56   18   110/44   94 


 


 01/03/18 03:24  01/03/18 03:24  01/03/18 07:22  01/03/18 03:24  01/03/18 03:24








 Laboratory Results - last 24 hr











  01/02/18





  08:42


 


Hepatitis C Antibody  Nonreactive

















Assessment:


66 yo F pod 1 s/p RTKA








Plan:


wbat rle 


pt/ot


labs pending 


lovenox and coumadin today

## 2018-01-03 NOTE — OP
OPERATIVE REPORT:

 

DATE OF OPERATION:  01/02/18

 

DATE OF BIRTH:  [birth year]

 

ATTENDING SURGEON:  Niesha Garcia MD

 

ASSISTANT:  BRIAN Osullivan

 

Mr. Mehta did help throughout the procedure with preparation of the leg, wound retraction, manipulat
ion of the knee, and wound closure.

 

ANESTHESIOLOGIST:  Dr. Nieto.

 

ANESTHESIA:  Spinal.

 

PRE-OP DIAGNOSIS:  Severe end-stage degenerative osteoarthritis of the right knee joint.

 

POST-OP DIAGNOSIS:  Severe end-stage degenerative osteoarthritis of the right knee joint.

 

OPERATIVE PROCEDURE:  Right total knee arthroplasty.

 

TOURNIQUET:  63 minutes.

 

ESTIMATED BLOOD LOSS:  200 cc.

 

DRAIN:  Medium Hemovac drain.

 

COMPLICATIONS:  None.

 

SPECIMEN:  Bone and cartilage of the right knee joint sent to Pathology.

 

HARDWARE USED:  This is cemented [employer]hew total knee arthroplasty hardware. Two packages of S
implex bone cement were used.  For the femur, a size 5 right posterior stabilized Legion Oxinium femo
ral component with a tibia size 3 right tibial baseplate Miranda II.  For the insert, a 9-mm posterio
r stabilized articular insert, size 3-4 and for the patella, a 32mm 3-peg all poly patella.

 

BRIEF HISTORY/INDICATION:  Ms. Neff is a 67-year-old female with years of increasingly severe r
ight knee pain.  Radiographs showed severe end-stage arthritis with wear of the medial tibial plateau
 due to chronic degeneration.  She failed conservative treatment with anti-inflammatories, pain medic
ation, intra-articular injection, and physical therapy.  She failed to lose weight and wished to proc
eed with right total knee arthroplasty due to continued pain and decreased quality of life.

 

Informed consent was obtained from the patient.  She understood the risks of surgery, included but we
re not limited to bleeding, infection, damage to nearby structures, continued pain, need for further 
surgery, intraoperative fracture, nerve palsy, hardware failure or loosening, knee stiffness, loss of
 motion, stroke, heart attack, blood clot, and death.  She wished to proceed.

 

INTRAOPERATIVE FINDINGS:  Intraoperatively, the patient was noted to have severe wear of the medial t
ibial plateau due to chronic degenerative osteoarthritis.  She had extensive osteophyte formation, brasher
bchondral sclerosis and complete loss of cartilage in all 3 compartments.  Preoperatively, the patien
t had 15 degrees of flexion contracture.

 

DESCRIPTION OF PROCEDURE:  Ms. Neff was identified in the preanesthesia unit. Her right lower e
xtremity was marked as the correct operative side.  Informed consent was signed and placed in the [facility]
rt.  The patient was taken to the operating room and placed under spinal anesthesia.  A Noguera cathete
r was placed.  Tourniquet was placed on the right thigh.  Right lower extremity was prepped and drape
d in the usual sterile fashion.  Preop time-out was made to correctly identify the patient's side and
 site.  Appropriate perioperative antibiotics were given within one hour of incision.

 

Tourniquet was inflated and total tourniquet time for this procedure was 63 minutes.  A 14-cm midline
 incision was made with a 10 blade and carried down through the subcutaneous fat, which was at least 
6 cm thick.  Morbid obesity did add time and complexity to this case.  A new 10-blade was used to riccardo
e a standard medial parapatellar arthrotomy.  The patella was subluxed laterally and was noted to hav
e extreme wear.  Electrocautery was used to subperiosteally elevate soft tissue off the superomedial 
tibia.  Medial osteophyte was carefully removed with a rongeur.  The knee was flexed up.  ACL was not
 present.  The anterior horn of the lateral meniscus was released.  A drill was used to enter the dis
jose femur. Intramedullary distal femoral cutting guide was placed and the distal 9 mm cut was made wi
th an oscillating saw.  The external rotation guide was pinned on the distal femur.  Distal femur was
 sized to a size 5.  Size 5 multi-cutting jig was placed on the distal femur.  Oscillating saw was us
ed to make the appropriate 4 chamfer cuts. The PCL was completely released.  The tibia was subluxed a
nteriorly.  The extramedullary proximal tibial cutting guide was pinned on the proximal tibia. Oscill
ating saw was used to make a proximal tibial cut perpendicular to the mechanical axis of the tibia.  
Medial tibial plateau was noted to have sclerosis with chronic wear.  The knee was brought out into f
ull extension.  There was good medial and lateral ligamentous balancing.  The knee was in full extens
ion.  Flexion and extension gaps were well balanced.

 

The knee was flexed up.  Lamina  was placed both medially and laterally. Any remaining menisc
us was carefully removed using electrocautery.  Posterior osteophytes were removed using a curved ost
eotome.  Tibial tray and drop vicki once again confirm satisfactory proximal tibial cut.

 

A size 5 right femoral trial was impacted on to the distal femur and had good fit. The box for the po
sterior stabilized implant was prepared using a reamer and box- cut osteotome.  A size 3 tibial tray 
with a 9-mm insert trial was placed and the knee was taken through a range of motion.  The knee had f
ull extension to 120 degrees of flexion.  Flexion was limited by body habitus.  Patellofemoral tracki
ng was satisfactory.  The knee was brought out into extension.  The patella was everted.  The lateral
 patellar facet had extreme wear.  Oscillating saw was used to remove the 8 mm of patellar bone and c
artilage.  This was largely removed from the medial patellar facet.  Patella was sized to size 32.  T
hree peg holes were drilled through the size 32 guide.  A trial 32 patella was placed and the knee wa
s taken through a range of motion.  There was satisfactory patellofemoral tracking.  All trials were 
carefully removed.  The tibia was subluxed anteriorly and sized to a size 3.  Proximal tibia was prep
ared using a size 3 keel punch.  All bony cut surfaces were copiously irrigated with sterile saline a
nd dried.  Final implants were cemented into place starting with the tibia, followed by the femur and
 last the patella.  A 9-mm insert trial was placed while the knee was brought out into full extension
.  The cement was allowed to fully cure and the tourniquet was turned down at 63 minutes.  The knee w
as copiously irrigated with sterile saline.

 

Once the cement had fully cured, the insert trial was removed.  Any excess cement was removed from ar
ound the implant capsule.  Electrocautery was used to obtain meticulous hemostasis.  Final insert cho
sen was a 9-mm posterior stabilized articular insert size 3-4.  This was locked into position on the 
tibial tray without difficulty.  Stability of the insert was checked and rechecked and noted to be st
able.

 

The knee was copiously irrigated with sterile saline.  Extensor mechanism was closed over a medium He
movac drain using an interrupted #1 Vicryls.  The rest of the incision was closed in a layered fashio
n using 0 and 2-0 Vicryls.  Skin was closed using running 3-0 nylon suture.  Sterile Xeroform, 4x4s, 
and Webril were used to cover the incision.  Ace wrap and cold packs were placed over this.  The alba
ent's anesthesia was reversed without difficulty.  She was taken to the PACU in stable condition.  In
tended weightbearing will be weightbearing as tolerated. Intended DVT prophylaxis will be Coumadin wi
th a Lovenox bridge.

 

 [id number]/[facility] #: [id number]

## 2018-01-04 VITALS — SYSTOLIC BLOOD PRESSURE: 166 MMHG | DIASTOLIC BLOOD PRESSURE: 60 MMHG

## 2018-01-04 LAB
HCT VFR BLD AUTO: 30 % (ref 35–47)
HGB BLD-MCNC: 9.9 G/DL (ref 12–16)
INR PPP/BLD: 1.27 (ref 0.77–1.02)
PLATELET # BLD AUTO: 165 10^3/UL (ref 150–450)

## 2018-01-04 RX ADMIN — OXYCODONE HYDROCHLORIDE PRN MG: 5 CAPSULE ORAL at 11:44

## 2018-01-04 RX ADMIN — OXYCODONE HYDROCHLORIDE PRN MG: 5 CAPSULE ORAL at 01:11

## 2018-01-04 RX ADMIN — PREGABALIN SCH MG: 100 CAPSULE ORAL at 08:03

## 2018-01-04 RX ADMIN — ATENOLOL SCH MG: 25 TABLET ORAL at 08:46

## 2018-01-04 RX ADMIN — ENOXAPARIN SODIUM SCH MG: 30 INJECTION SUBCUTANEOUS at 11:40

## 2018-01-04 RX ADMIN — DOCUSATE SODIUM SCH MG: 100 CAPSULE, LIQUID FILLED ORAL at 08:46

## 2018-01-04 RX ADMIN — DULOXETINE HYDROCHLORIDE SCH MG: 60 CAPSULE, DELAYED RELEASE ORAL at 08:03

## 2018-01-04 RX ADMIN — HYDROCHLOROTHIAZIDE SCH MG: 25 TABLET ORAL at 08:46

## 2018-01-04 RX ADMIN — MAGNESIUM HYDROXIDE PRN ML: 400 SUSPENSION ORAL at 08:47

## 2018-01-04 RX ADMIN — OXYCODONE HYDROCHLORIDE AND ACETAMINOPHEN PRN TAB: 5; 325 TABLET ORAL at 08:46

## 2018-01-04 NOTE — PN
Progress Note





- Progress Note


Date of Service: 01/04/18


SOAP: 


Subjective:


[]Patient seen at bedside. She feels well and desires DC. No CP, SOB, 

dizziness. Op site pain is well controlled.





Objective:


[]General: NAD, calm and cooperative


RLE: Dressing changed by Dr Garcia this morning without complication. DF/PF 

intact. DP 2+


BL LE: calves supple and nontender without erythema, edema or palpable cords


 Vital Signs











Temp  97.0 F   01/04/18 11:08


 


Pulse  82   01/04/18 11:39


 


Resp  18   01/04/18 11:44


 


BP  166/60   01/04/18 11:08


 


Pulse Ox  92   01/04/18 11:39








 Intake & Output











 01/03/18 01/04/18 01/04/18





 18:59 06:59 18:59


 


Intake Total 2532 640 360


 


Output Total 700 2275 900


 


Balance 1832 -1635 -540


 


Intake:   


 


  IV Fluids 1716  


 


    LR 1716  


 


  IVPB 176  


 


      


 


  Oral 640 640 360


 


Output:   


 


  Urine 700 2275 900








 Laboratory Last Values











Hgb  9.9 g/dl (12.0-16.0)  L  01/04/18  04:58    


 


Hct  30 % (35-47)  L  01/04/18  04:58    


 


Plt Count  165 10^3/ul (150-450)   01/04/18  04:58    


 


MPV  8 um3 (7.4-10.4)   01/04/18  04:58    


 


INR (Anticoag Therapy)  1.27  (0.77-1.02)  H  01/04/18  04:59    


 


Sodium  134 mmol/L (133-145)   01/03/18  08:49    


 


Potassium  3.9 mmol/L (3.5-5.0)   01/03/18  08:49    


 


Chloride  100 mmol/L (101-111)  L  01/03/18  08:49    


 


Carbon Dioxide  29 mmol/L (22-32)   01/03/18  08:49    


 


Anion Gap  5 mmol/L (2-11)   01/03/18  08:49    


 


BUN  20 mg/dL (6-24)   01/03/18  08:49    


 


Creatinine  0.73 mg/dL (0.51-0.95)   01/03/18  08:49    


 


Est GFR ( Amer)  102.6  (>60)   01/03/18  08:49    


 


Est GFR (Non-Af Amer)  79.8  (>60)   01/03/18  08:49    


 


BUN/Creatinine Ratio  27.4  (8-20)  H  01/03/18  08:49    


 


Glucose  126 mg/dL ()  H  01/03/18  08:49    


 


Calcium  8.7 mg/dL (8.6-10.3)   01/03/18  08:49    


 


Hepatitis C Antibody  Nonreactive  (Nonreactive)   01/02/18  08:42    














Assessment:


[] POD2 SP right total knee arthoplasty








Plan:


[]WBAT


PT OT


6 mg coumadin today


DC home

## 2018-01-05 NOTE — DS
DISCHARGE SUMMARY:

 

DATE OF ADMISSION:  01/02/18

 

DATE OF SURGERY:  01/02/18

 

DATE OF DISCHARGE/SERVICE:  01/04/18

 

PROVIDER/SURGEON:  Niesha Garcia MD * (DICTATED BY BRIAN QUINTERO)

 

ASSISTANT:  BRIAN Osullivan

 

PREOPERATIVE DIAGNOSIS:  Severe end-stage degenerative osteoarthritis of the 
right knee joint.

 

OPERATIVE PROCEDURE:  Right total knee arthroplasty.

 

HISTORY:  Ms. Neff is a 67-year-old female with years of increasingly 
severe right knee pain.  Radiographs show end-stage arthritis and wear of the 
medial tibial plateau due to chronic degeneration.  She failed conservative 
treatment with anti-inflammatories, pain medications, intraarticular injection, 
and physical therapy.  She failed to lose weight and wished to proceed with 
right total knee arthroplasty.

 

HOSPITAL COURSE:  Postop day 1, right lower extremity drain was removed with 
the tip intact.  Calf was soft.  Dorsiflexion and plantarflexion intact.  
Sensation to light touch intact.  2+ dorsalis pedis pulse.  At this time, labs 
showed hemoglobin 10.5, hematocrit 31, INR 1.10.  Postop day 2, dressing was 
changed by Dr. Garcia without complication.  Dorsiflexion and plantarflexion 
intact.  Dorsalis pedis 2+. Labs; hemoglobin 9.9, hematocrit 30, INR 1.27.  The 
patient was deemed to be orthopedically and medically stable for discharge.

 

DISCHARGE MEDICATIONS:

1.  Lyrica 50 mg p.o. 1 p.r.n.

2.  Atenolol 12.5 mg p.o. q.a.m.

3.  Ibuprofen, discontinued.

4.  Lyrica 100 mg p.o. b.i.d.

5.  Cymbalta 120 mg p.o. q.a.m.

6.  Hydrochlorothiazide 25 mg p.o. q.a.m.

7.  Acetaminophen 650 mg p.o. q.4 hours p.r.n., max daily dose of 4000.

8.  Docusate 100 mg p.o. t.i.d.

9.  Oxycodone/acetaminophen 5/325 mg 1 to 2 tabs p.o. q.4 hours p.r.n., max 
daily dose of 10.

10.  Warfarin 2 mg tabs take 1 to 3 tablets depending on INR draws.

 

DISCHARGE PLAN:  Weightbearing as tolerated.  Go to the emergency room with 
shortness of breath or chest pain.  Call orthopedic office for increased 
drainage, redness, increased pain or fever.  Home nurse will draw INR on 
Mondays and Thursdays.  Coumadin dosing 6 mg on 01/04/18; 2 mg on 01/05/18, 4 
mg on 01/06/18; 2 mg on 01/07/18.  Recheck INR on 01/08/18.  Pain control with 
Percocet 5/325 one to two tabs every 4 to 6 hours p.r.n., max of 10 daily.  Do 
not exceed 4000 mg of Tylenol from all sources per day.

 

____________________________________ BRIAN QUINTERO

 

[id number]/[facility] #: [id number]

MTDD

## 2018-10-26 NOTE — HP
HISTORY AND PHYSICAL:

 

DATE OF ADMISSION/SURGERY:  11/08/18

 

DATE OF OFFICE VISIT:  10/26/18

 

SURGEON:  Niesha Garcia MD * (DICTATED BY BRIAN MILAN)

 

PROCEDURE:  Left total knee arthroplasty.

 

PRIMARY CARE PHYSICIAN:  Celia Linn MD

 

CHIEF COMPLAINT:  Left knee pain.

 

HISTORY OF PRESENT ILLNESS: Ms. Neff is a 68-year-old female with end-
stage osteoarthritis of the left knee.  She has failed conservative treatment 
and elected to proceed with a left total knee arthroplasty, which is scheduled 
for 

11/08/18.

 

PAST MEDICAL HISTORY:

1.  Hypertension.

2.  Depression.

3.  Polymyalgia rheumatica.

4.  Mitral valve prolapse.

 

PAST SURGICAL HISTORY:

1.  Lithotripsy.

2.  Cholecystectomy.

3.  Appendectomy.

4.  Right foot surgery.

5.  [place] teeth extraction.

6.  Right total knee arthroplasty.

 

CURRENT MEDICATIONS:

1.  Lyrica 50 mg as needed.

2.  Hydrochlorothiazide 25 mg daily.

3.  Cymbalta 30 mg 2 capsules daily.

4.  Atenolol 25 mg half a tab daily.

5.  Lyrica 100 mg daily.

6.  Ibuprofen as needed.

7.  Daily multivitamin.

 

ALLERGIES:  AMPICILLIN, CODEINE causing vomiting, hay fever, OXCARBAZEPINE, 
CARBAMAZEPINE.

 

FAMILY HISTORY:  Coronary artery disease and cancer.

 

SOCIAL HISTORY:  She is a 68-year-old female, lives with her .  She does 
not smoke or use drugs.  Uses alcohol rarely.

 

REVIEW OF SYSTEMS:  A complete 14-point review of systems was reviewed with the 
patient.  It was positive for history of kidney stones requiring lithotripsy.  
She denies history of DVT, PE, hepatitis, HIV, or anesthesia problems.

 

                               PHYSICAL EXAMINATION

 

GENERAL:  She is well developed, well nourished, in no acute distress.

 

VITAL SIGNS:  She stands 62 inches tall, weighs 228 pounds.  Her blood pressure 
is 147/78, her heart rate is 82.

 

HEENT:  Normocephalic, atraumatic.

 

NECK:  Supple.  No palpable lymph nodes.

 

PULMONARY:  The lungs are clear to auscultation bilaterally.

 

CARDIO:  Regular rate and rhythm.  Strong S1, S2.

 

ABDOMEN:  Soft, nontender, nondistended.

 

NEUROLOGICAL:  She is alert and oriented x3.

 

MUSCULOSKELETAL:  Left lower extremity, the skin is intact.  There are no open 
wounds or abrasions.  She has a moderate joint effusion, some tenderness over 
the medial and lateral joint line.  She walks with an antalgic type gait 
favoring her left knee.  She has a 2+ dorsalis pedis pulse, intact sensation to 
her lower extremity, muscle group strengths are intact at 5/5.

 

 ASSESSMENT AND PLAN:  Ms. Neff is a 68-year-old female with continued 
complaints of left knee pain secondary to end-stage osteoarthritis.  She has 
failed conservative treatment and elected to proceed with a left total knee 
arthroplasty. The surgery is scheduled for 11/08/18 with Dr. Garcia.  Dr. Garcia 
discussed the risks and the benefits of the surgery at today's visit and all of 
her questions were answered.  She will follow up with Dr. Garcia 2 weeks after 
the surgery.

 

 ____________________________________ BRIAN MILAN

 

[id number]/[facility] #: [id number]

MTDD

## 2018-11-08 ENCOUNTER — HOSPITAL ENCOUNTER (INPATIENT)
Dept: HOSPITAL 25 - AA | Age: 68
LOS: 1 days | Discharge: HOME HEALTH | DRG: 470 | End: 2018-11-09
Attending: ORTHOPAEDIC SURGERY | Admitting: ORTHOPAEDIC SURGERY
Payer: MEDICARE

## 2018-11-08 DIAGNOSIS — I10: ICD-10-CM

## 2018-11-08 DIAGNOSIS — Z82.49: ICD-10-CM

## 2018-11-08 DIAGNOSIS — Z90.49: ICD-10-CM

## 2018-11-08 DIAGNOSIS — I34.1: ICD-10-CM

## 2018-11-08 DIAGNOSIS — Z88.5: ICD-10-CM

## 2018-11-08 DIAGNOSIS — B02.22: ICD-10-CM

## 2018-11-08 DIAGNOSIS — J45.909: ICD-10-CM

## 2018-11-08 DIAGNOSIS — M35.3: ICD-10-CM

## 2018-11-08 DIAGNOSIS — M17.12: Primary | ICD-10-CM

## 2018-11-08 DIAGNOSIS — M25.762: ICD-10-CM

## 2018-11-08 DIAGNOSIS — Z88.1: ICD-10-CM

## 2018-11-08 DIAGNOSIS — Z88.0: ICD-10-CM

## 2018-11-08 DIAGNOSIS — D64.9: ICD-10-CM

## 2018-11-08 DIAGNOSIS — F32.9: ICD-10-CM

## 2018-11-08 DIAGNOSIS — E66.9: ICD-10-CM

## 2018-11-08 DIAGNOSIS — I36.1: ICD-10-CM

## 2018-11-08 DIAGNOSIS — Z83.511: ICD-10-CM

## 2018-11-08 DIAGNOSIS — Z83.79: ICD-10-CM

## 2018-11-08 DIAGNOSIS — Z87.442: ICD-10-CM

## 2018-11-08 DIAGNOSIS — Z96.651: ICD-10-CM

## 2018-11-08 DIAGNOSIS — Z80.3: ICD-10-CM

## 2018-11-08 DIAGNOSIS — M25.462: ICD-10-CM

## 2018-11-08 DIAGNOSIS — I77.819: ICD-10-CM

## 2018-11-08 DIAGNOSIS — I45.2: ICD-10-CM

## 2018-11-08 PROCEDURE — 80048 BASIC METABOLIC PNL TOTAL CA: CPT

## 2018-11-08 PROCEDURE — 85014 HEMATOCRIT: CPT

## 2018-11-08 PROCEDURE — 85610 PROTHROMBIN TIME: CPT

## 2018-11-08 PROCEDURE — 36415 COLL VENOUS BLD VENIPUNCTURE: CPT

## 2018-11-08 PROCEDURE — C1776 JOINT DEVICE (IMPLANTABLE): HCPCS

## 2018-11-08 PROCEDURE — 85049 AUTOMATED PLATELET COUNT: CPT

## 2018-11-08 PROCEDURE — 0SRD069 REPLACEMENT OF LEFT KNEE JOINT WITH OXIDIZED ZIRCONIUM ON POLYETHYLENE SYNTHETIC SUBSTITUTE, CEMENTED, OPEN APPROACH: ICD-10-PCS | Performed by: ORTHOPAEDIC SURGERY

## 2018-11-08 PROCEDURE — 85018 HEMOGLOBIN: CPT

## 2018-11-08 RX ADMIN — MAGNESIUM HYDROXIDE SCH ML: 400 SUSPENSION ORAL at 19:19

## 2018-11-08 RX ADMIN — DOCUSATE SODIUM SCH MG: 100 CAPSULE, LIQUID FILLED ORAL at 19:19

## 2018-11-08 RX ADMIN — CEFAZOLIN SODIUM SCH MLS/HR: 1 SOLUTION INTRAVENOUS at 21:15

## 2018-11-08 RX ADMIN — PREGABALIN SCH MG: 100 CAPSULE ORAL at 19:19

## 2018-11-08 RX ADMIN — OXYCODONE HYDROCHLORIDE AND ACETAMINOPHEN PRN TAB: 5; 325 TABLET ORAL at 19:19

## 2018-11-08 NOTE — XMS REPORT
Abhi Vu

 Created on:2018



Patient:Abhi Vu

Sex:Female

:1950

External Reference #:[id number]





Demographics







 Address  [address]



   [place] [address]

 

 Home Phone  [phone]

 

 Mobile Phone  [phone]

 

 Email Address  [email or web address]

 

 Preferred Language  English

 

 Marital Status  Not  Or 

 

 Denominational Affiliation  Unknown

 

 Race  White

 

 Ethnic Group  Not  Or 









Author







 Organization  [employer]

 

 Address  [address]



   [place] [address]

 

 Phone  [phone]









Support







 Name  Relationship  Address  Phone

 

 Amari Vu  Unavailable  Unavailable  [phone]









Care Team Providers







 Name  Role  Phone

 

 Celia Linn MD  Primary Care Physician  Unavailable









Payers







 Type  Date  Identification Numbers  Payment Provider  Subscriber

 

 Health Maintenance    Policy Number:  Medicare Blue Ppo  Abhi Vu



 [facility] ([facility])    [id number]    









 Group Number: [id number]  [address] 

 

 PayID:   SHAYY Saldaña [id number]









 Medigap Part B  Effective:  Policy Number:  FRANKO Rodarte  Abhi WHITMORE Patricialucia



   2014  [id number]    









 Expires: 2015  PayID: [id number]  [facility] [id number]









 SHAYY Saldaña [id number]









 Medigap Part B  Effective:  Policy Number:  FRANKO Rodarte  Abhi WHITMORE Patricialucia



   2011  [id number]    









 Expires: 2013  PayID: [id number]  [place] [id number]









 SHAYY Saldaña [id number]







Problems







 Date  Description  Provider  Status

 

 Onset: 2012  Essential hypertension  Qutaybeh S. Maghaydah, Active M.D.  

 

 Onset: 2012  Mitral valve disorder  Qutaybeh S. Maghaydah, Active M.D.  

 

 Onset: 2012  Tricuspid valve disorder,  Qutaybeh S. Maghaydah,  Active



   non-rheumatic  M.DBhaskar  

 

 Onset: 2013  Electrocardiogram abnormal  Qutaybeh S. Maghaydah, Active M.D.  

 

 Onset: 2013  Chest pain  Qutaybeh S. Maghaydah,  Active



     M.D.  

 

 Onset: 2013  Right bundle branch block AND left  Qutaybeh S. Maghaydah,  
Active



   posterior fascicular block  M.DBhaskar  

 

 Onset: 01/10/2014  Heart murmur  [facility]  Active

 

 Onset: 2016  Post-herpetic trigeminal neuralgia  Velvet Hanson M.D.  
Active

 

 Onset: 2017  Localized, primary osteoarthritis  Niesha Garcia M.D.  
Active

 

 Onset: 2017  Localized, primary osteoarthritis  Niesha Garcia M.D.  
Active



   of the pelvic region and thigh    

 

 Onset: 2018  Arthroplasty of knee  Niesha Garcia M.D.  Active

 

 Onset: 2018  Aftercare following joint  Niesha Garcia M.D.  Active



   replacement surgery    

 

 Onset: 10/22/2018  Essential tremor  Duy Farmer MD  Active

 

 Onset: 10/22/2018  Trigeminal neuralgia  Duy Farmer MD  Active







Family History







 Date  Family Member(s)  Problem(s)  Comments

 

   General  Heart Disease  

 

   General  Hypertension  

 

   General  Cancer  

 

   Father   due to MI x3  () - age 51 yr

 

   Mother   due to Natural Causes  ()

 

   Paternal Grandfather   due to influenza  ()

 

   Paternal Grandmother   due to Influenza  ()

 

   Maternal Grandfather   due to Lung disease  ()

 

   Maternal Grandmother   due to drowned  ()







Social History







 Type  Date  Description  Comments

 

 Marital Status      

 

 Lives With    Spouse  

 

 Occupation    consultant  

 

 Cigarette Use    Never Smoked Cigarettes  

 

 ETOH Use    Rarely consumes alcohol  

 

 Smoking    Patient has never smoked  

 

 Recreational Drug Use    Denies Drug Use  

 

 Daily Caffeine    Consumes on average 2 cups of  and ice tea



     hot tea per day  

 

 Enjoy Exercising    Enjoys exercising  Walking, water aerobics

 

 Exercise Type/Frequency    Walks daily  [address] steps per day

 

 Exercise Type/Frequency    Swims 2 times a week  Aqua deonna







Allergies, Adverse Reactions, Alerts







 Date  Description  Reaction  Status  Severity  Comments

 

 2009  Ampicillin    active    hives

 

 2009  Codeine Phosphate    active    N/V

 

 2009  Hay Fever    active    

 

 2009  Oxcarbazepine    active    rash

 

 2009  Carbamazepine    active    rash

 

 2017  Amoxicillin    active    







Medications







 Medication  Date  Status  Form  Strength  Qnty  SIG  Indications  Ordering



                 Provider

 

 Amoxicillin    Active  Capsules  500mg  4caps  take 4              pills, 2    ISABELA Garcia



             g 1 hour    



             before    



             dental or    



             gi    



             procedure    

 

 Lyrica    Active  Capsules  50mg  30cap  1 tab by            s  mouth    MD Darian



             daily as    



             needed    

 

 Hydrochlorothiaz    Active  Tablets  25mg  90tab  take one  I10  Qutaybeh 
S.



         s  tablet by    Jose,



             mouth    M.D.



             every day    

 

 Cymbalta    Active  Caps DR  30mg  60cap  take two  B02.22  Christoph    Part    s  capsules    ISABELA Hernandez



             by mouth    



             every day    

 

 Atenolol    Active  Tablets  25mg  30tab  1/2  po    Unknown



   /0000        s  qd    

 

 Lyrica    Active  Capsules  100mg  60cap  1 by    Lauren



   /        s  mouth    Barbara,



             daily in    M.D.



             the    



             evening    



             and one    



             in the    



             morning    

 

 Ibuprofen    Active    200mg    200    Unknown



   /          mg-400 mg    



             daily prn    

 

                 

 

 Tizanidine HCL    Hx  Capsules  4mg  60cap  take one    Niesha        s  tab twice    ISABELA Garcia



   -          daily as    



             needed.    



                 

 

 Cyclobenzaprine    Hx  Tablets  10mg  60tab  take 1  Z47.1  Niesha



 HCL  /2018        s  tab by    ISABELA Garcia



   -          mouth 2-3    



             times           day as    



             needed    

 

 Metaxalone    Hx  Tablets  800mg  60tab  take one            s  tab 2-3    ISABELA Garcia



   -          times a    



             day as    



   /2018          needed    

 

 Percocet    Hx  Tablets  5-325mg  90tab  1 -2 tabs            s  by mouth    SIABELA Garcia



   -          every 4-6    



             hours as    



   /2018          needed    



             pain    

 

 Coumadin    Hx  Tablets  2mg  60tab  take 1    Niesha        s  tab by    ISABELA Garcia



   -          mouth    



   02/11          every    



   /2018          night at    



             bedtime    



             or as    



             directed    



             by md    



             through    



             visiting    



             nurse.    



             Do not    



             take    



             prior to    



             surgery.    

 

 Stool Softener    Hx  Capsules  100mg  90cap  1 tab po    Niesha        s  2-3 times    ISABELA Garcia



   -          daily    



             while on    



             narcotic    



             pain    



             medicine.    

 

 Lyrica    Hx  Capsules  100mg  60cap  one twice    Velvet HERRERA



           s  a day    Brian Hanson M.D.



                 

 

 Lyrica    Hx  Capsules  50mg  60cap  1 by    Velvet HERRERA



           s  mouth per    Margie,



   -          day as    M.DBhaskar



             needed in    



             addition    



             to 100mg    



             tabs    

 

 Hydrochlorothiaz    Hx  Tablets  25mg  90tab  1 po qd    Other



         s      Ordering



   -              Provider



                 

 

 Cozaar    Hx  Tablets  50mg  60tab  1 pill by    Kailey



           s  mouth    Butcher,



   -          twice per    D.OBhaskar



   11/27          day    



   /2013              

 

 Lisinopril    Hx  Tablets  5mg  30tab  1 po qd    Qutaybeh S        Brian Jade M.D.



                 

 

 Norvasc    Hx  Tablets  10mg  90tab  1 po qd    Qutaybeh S        Brian Jade M.D.



                 

 

 Zyrtec    Hx  Chewtabs  10mg  30uni  1 tab po    Qutaybeh S.



           ts  qd    Brian Garcia M.D.



                 

 

 Norvasc    Hx  Tablets  5mg  30tab  1 po qd    Qutaybeh S.



           Brian Jade M.D.



                 

 

 Lyrica  00/  Hx  Capsules  150mg  60cap  1 po bid    Unknown



   /0000        s  and 1 qhs    



   -              



                 

 

 Multi Vitamin  00/00  Hx  Tablets      1 po qd    Unknown



   /0000              



   -              



                 

 

 Fish Oil  00  Hx  Capsules  1000mg    1 po qd    Unknown



   /0000              



   -              



                 

 

 Lyrica  00/00  Hx  Capsules  150mg    1 po    Unknown



   /0000          daily in    



   -          the    



   12/29          morning    



   /2016              

 

 Cipro    Hx  Tablets  500mg  20tab  1 po qd x    Unknown



   /0000        s  3 days    



   -              



   04/10              



   /2012              

 

 Vitamin B6  00/00  Hx  Tablets  250mg    2 po qd    Unknown



   /0000              



   -              



                 

 

 Magnesium  00/00  Hx        1 spray    Unknown



   /0000          qd on    



   -          abdomen    



                 

 

 Ibuprofen  00/00  Hx  Tablets  400mg    by mouth    Unknown



   /0000          every 4    



   -          to 6    



   02/11          hours as    



   /2018          needed    

 

 Aspir-81  00  Hx  Tablets DR  81mg    1 by    Unknown



   /0000          mouth    



   -          every day    



                 

 

 Claritin    Hx  Capsules  10mg    1 by    Unknown



   /0000          mouth    



   -          every day    



             as needed    



                 

 

 Ciprofloxacin    Hx  Tablets  500mg        Husseini,



 HCL  /              MD García



   -              



   10/31              



   /2017              

 

 Aspirin  00  Hx    325mg        Unknown



   /0000              



   -              



   10/18              



   /2018              

 

 Duloxetine HCL    Hx  Caps DR  30mg    2 by    Unknown



   /0000    Part      mouth    



   -          every day    



                 

 

 Pneumococcal,Uns    Active  Injection          Unknown



 pecified  /              







Medications Administered in Office







 Medication  Date  Status  Form  Strength  Qnty  SIG  Indications  Ordering



                 Provider

 

 Depomedrol    Administered  Injection          Niesha



 40MG  017              ISABELA Garcia

 

 Depomedrol    Administered  Injection          Niesha



 40MG  017              ISABELA Garcia







Vital Signs







 Date  Vital  Result  Comment

 

 10/22/2018  Height  62 inches  5'2"









 Weight  243.38 lb  

 

 Heart Rate  76 /min  

 

 BP Systolic Sitting  126 mmHg  

 

 BP Diastolic Sitting  80 mmHg  

 

 BMI (Body Mass Index)  44.5 kg/m2  









 10/19/2018  Height  62 inches  5'2"









 Weight  244.50 lb  with shoes

 

 Heart Rate  74 /min  

 

 BP Systolic  164 mmHg  

 

 BP Diastolic  88 mmHg  

 

 BMI (Body Mass Index)  44.7 kg/m2  

 

 Ejection Fraction  60-65%  ECHO. 2017  Height  62 inches  5'2"









 Weight  228.00 lb  

 

 BP Systolic  138 mmHg  

 

 BP Diastolic  84 mmHg  

 

 Body Temperature  98.1 F  

 

 BMI (Body Mass Index)  41.7 kg/m2  









 2018  Height  62 inches  5'2"









 Weight  225.00 lb  

 

 BP Systolic  144 mmHg  

 

 BP Diastolic  84 mmHg  

 

 Body Temperature  97.8 F  

 

 Pain Level  0  

 

 BMI (Body Mass Index)  41.1 kg/m2  









 2018  Height  63 inches  5'3"









 Weight  236.00 lb  per pt

 

 Heart Rate  64 /min  reg

 

 BP Systolic Sitting  144 mmHg  Lue

 

 BP Diastolic Sitting  90 mmHg  Lue

 

 Respiratory Rate  16 /min  

 

 Pain Level  2  right knee

 

 BMI (Body Mass Index)  41.8 kg/m2  









 2017  Height  63 inches  5'3"









 Weight  236.00 lb  

 

 Heart Rate  78 /min  

 

 BP Systolic Sitting  180 mmHg  

 

 BP Diastolic Sitting  90 mmHg  

 

 Body Temperature  98.5 F  

 

 BMI (Body Mass Index)  41.8 kg/m2  









 2017  Height  63 inches  5'3"









 Weight  228.00 lb  

 

 Heart Rate  72 /min  

 

 BP Systolic  138 mmHg  

 

 BP Diastolic  82 mmHg  

 

 Respiratory Rate  16 /min  

 

 Body Temperature  98.5 F  

 

 BMI (Body Mass Index)  40.4 kg/m2  









 2017  Height  63 inches  5'3"









 Weight  228.00 lb  with shoes

 

 Heart Rate  58 /min  

 

 BP Systolic Sitting  166 mmHg  LA lrg cuff

 

 BP Diastolic Sitting  104 mmHg  LA lrg cuff

 

 BMI (Body Mass Index)  40.4 kg/m2  

 

 Ejection Fraction  60%-65%  echo 01/15/13









 2017  Height  63 inches  5'3"









 Weight  225.00 lb  

 

 Heart Rate  61 /min  

 

 BP Systolic  147 mmHg  

 

 BP Diastolic  73 mmHg  

 

 BMI (Body Mass Index)  39.9 kg/m2  









 09/15/2017  Height  63 inches  5'3"









 Weight  227.00 lb  w/ shoes

 

 Heart Rate  66 /min  reg

 

 BP Systolic Sitting  150 mmHg  Rue, lg cuff

 

 BP Diastolic Sitting  94 mmHg  Rue, lg cuff

 

 Respiratory Rate  16 /min  

 

 BMI (Body Mass Index)  40.2 kg/m2  

 

 Ejection Fraction  60-65%  as of 2013 echo









 2017  Height  63 inches  5'3"









 Weight  230.00 lb  

 

 Heart Rate  68 /min  

 

 BP Systolic  156 mmHg  

 

 BP Diastolic  84 mmHg  

 

 Respiratory Rate  16 /min  

 

 Pain Level  2  

 

 BMI (Body Mass Index)  40.7 kg/m2  









 2017  Height  63 inches  5'3"









 Weight  217.00 lb  

 

 Heart Rate  66 /min  

 

 BP Systolic  142 mmHg  

 

 BP Diastolic  86 mmHg  

 

 Respiratory Rate  18 /min  

 

 Body Temperature  98.4 F  

 

 BMI (Body Mass Index)  38.4 kg/m2  









 12/15/2016  Heart Rate  61 /min  









 BP Systolic  132 mmHg  LA, large

 

 BP Diastolic  80 mmHg  LA, large

 

 BP Systolic Sitting  152 mmHg  LA, home unit

 

 BP Diastolic Sitting  78 mmHg  LA, home unit









 2016  Height  64 inches  5'4"









 Weight  222.00 lb  without shoes

 

 Heart Rate  76 /min  

 

 BP Systolic Sitting  160 mmHg  Rue reg cuff

 

 BP Diastolic Sitting  86 mmHg  Rue reg cuff

 

 BP Systolic Standing  156 mmHg  Rue reg cuff

 

 BP Diastolic Standing  80 mmHg  Rue reg cuff

 

 Respiratory Rate  17 /min  

 

 BMI (Body Mass Index)  38.1 kg/m2  









 11/10/2016  Height  64 inches  5'4"









 Weight  218.00 lb  

 

 Heart Rate  56 /min  

 

 BP Systolic Sitting  132 mmHg  

 

 BP Diastolic Sitting  86 mmHg  

 

 Respiratory Rate  14 /min  

 

 BMI (Body Mass Index)  37.4 kg/m2  









 2016  Height  64 inches  5'4"









 Weight  220.00 lb  

 

 Heart Rate  56 /min  

 

 BP Systolic Sitting  108 mmHg  

 

 BP Diastolic Sitting  70 mmHg  

 

 Respiratory Rate  14 /min  

 

 BMI (Body Mass Index)  37.8 kg/m2  









 2016  Height  64 inches  5'4"









 Weight  221.00 lb  w/o shoes

 

 Heart Rate  60 /min  

 

 BP Systolic Sitting  118 mmHg  LA lg cuff

 

 BP Diastolic Sitting  90 mmHg  LA lg cuff

 

 BMI (Body Mass Index)  37.9 kg/m2  

 

 Ejection Fraction  60-65  echo 1/15/13









 2015  Height  64 inches  5'4"









 Weight  237.00 lb  

 

 Heart Rate  70 /min  

 

 BP Systolic  146 mmHg  LA large

 

 BP Diastolic  78 mmHg  LA large

 

 BMI (Body Mass Index)  40.7 kg/m2  

 

 Ejection Fraction  60-65%  1/15/13 ECHO









 2014  Height  64 inches  5'4"









 Weight  240.25 lb  

 

 Heart Rate  64 /min  

 

 BP Systolic Sitting  144 mmHg  LA lg cuff

 

 BP Diastolic Sitting  82 mmHg  LA lg cuff

 

 Respiratory Rate  16 /min  

 

 BMI (Body Mass Index)  41.2 kg/m2  









 2013  Height  64 inches  5'4"









 Weight  228.00 lb  

 

 Heart Rate  68 /min  

 

 BP Systolic Sitting  130 mmHg  

 

 BP Diastolic Sitting  82 mmHg  

 

 Respiratory Rate  16 /min  

 

 BMI (Body Mass Index)  39.1 kg/m2  









 2013  Height  64 inches  5'4"









 Weight  247.00 lb  

 

 Heart Rate  58 /min  

 

 BP Systolic  118 mmHg  

 

 BP Diastolic  68 mmHg  

 

 BMI (Body Mass Index)  42.4 kg/m2  









 2012  Height  64 inches  5'4"









 Weight  260.00 lb  

 

 Heart Rate  70 /min  

 

 BP Systolic  128 mmHg  

 

 BP Diastolic  70 mmHg  

 

 BP Systolic Sitting  108 mmHg  pulse 80

 

 BP Diastolic Sitting  62 mmHg  pulse 80

 

 BP Systolic Standing  118 mmHg  pulse 84

 

 BP Diastolic Standing  70 mmHg  pulse 84

 

 BP Systolic Lying Down  116 mmHg  pulse 64

 

 BP Diastolic Lying Down  60 mmHg  pulse 64

 

 BMI (Body Mass Index)  44.6 kg/m2  









 04/10/2012  Height  64 inches  5'4"









 Weight  259.75 lb  

 

 Heart Rate  65 /min  

 

 BP Systolic Sitting  160 mmHg  home unit: 161/94

 

 BP Diastolic Sitting  80 mmHg  home unit: 161/94

 

 BMI (Body Mass Index)  44.6 kg/m2  









 2012  Height  64 inches  5'4"









 Weight  261.00 lb  

 

 Heart Rate  49 /min  

 

 BP Systolic Sitting  152 mmHg  

 

 BP Diastolic Sitting  90 mmHg  

 

 BMI (Body Mass Index)  44.8 kg/m2  









 2011  Height  64 inches  5'4"









 Weight  265.00 lb  

 

 Heart Rate  60 /min  

 

 BP Systolic Sitting  164 mmHg  L

 

 BP Diastolic Sitting  80 mmHg  L

 

 BMI (Body Mass Index)  45.5 kg/m2  









 2009  Height  64 inches  5'4"









 Weight  259.00 lb  

 

 Heart Rate  60 /min  

 

 BP Systolic Sitting  138 mmHg  

 

 BP Diastolic Sitting  82 mmHg  

 

 BMI (Body Mass Index)  44.5 kg/m2  









 2009  Height  64 inches  5'4"









 Weight  257.00 lb  

 

 Heart Rate  60 /min  

 

 BP Systolic Sitting  158 mmHg  L

 

 BP Diastolic Sitting  70 mmHg  L

 

 BMI (Body Mass Index)  44.1 kg/m2  









 2009  Height  64 inches  5'4"









 Weight  263.00 lb  

 

 Heart Rate  53 /min  

 

 BP Systolic Sitting  130 mmHg  

 

 BP Diastolic Sitting  80 mmHg  

 

 BP Systolic Standing  140 mmHg  

 

 BP Diastolic Standing  90 mmHg  

 

 BP Systolic Lying Down  130 mmHg  

 

 BP Diastolic Lying Down  80 mmHg  

 

 Respiratory Rate  16 /min  

 

 BMI (Body Mass Index)  45.1 kg/m2  







Results







 Test  Date  Test  Result  H/L  Range  Note

 

 Urinalysis Profile  2017  Urine Color  Yellow      1









 Urine Appearance  Clear      1

 

 Urine Specific Gravity  1.013    1.010-1.030  1

 

 Urine pH  6.0    5-9  1

 

 Urine Urobilinogen  Negative    Negative  1

 

 Urine Ketones  Negative    Negative  1

 

 Urine Protein  Negative    Negative  1

 

 Urine Leukocytes  Negative    Negative  1

 

 Urine Blood  Negative    Negative  1

 

 Urine Nitrite  Negative    Negative  1

 

 Urine Bilirubin  Negative    Negative  1

 

 Urine Glucose  Negative    Negative  1









 Comp Metabolic Panel  2017  Sodium  137 mmol/L    133-145  1









 Potassium  4.3 mmol/L    3.5-5.0  1

 

 Chloride  103 mmol/L    101-111  1

 

 Co2 Carbon Dioxide  29 mmol/L    22-32  1

 

 Anion Gap  5 mmol/L    2-11  1

 

 Glucose  91 mg/dL      1

 

 Blood Urea Nitrogen  19 mg/dL    6-24  1

 

 Creatinine  0.79 mg/dL    0.51-0.95  1

 

 BUN/Creatinine Ratio  24.1  High  8-20  1

 

 Calcium  9.2 mg/dL    8.6-10.3  1

 

 Total Protein  6.9 g/dL    6.4-8.9  1

 

 Albumin  4.2 g/dL    3.2-5.2  1

 

 Globulin  2.7 g/dL    2-4  1

 

 Albumin/Globulin Ratio  1.6    1-3  1

 

 Total Bilirubin  0.50 mg/dL    0.2-1.0  1

 

 Alkaline Phosphatase  88 U/L      1

 

 Alt  8 U/L    7-52  1

 

 Ast  17 U/L    13-39  1

 

 Egfr Non-  72.8    >60  1

 

 Egfr   93.6    >60  1, 2









 Urine Culture And  2017  Urine Culture  SEE RESULT BELOW      1, 3



 Sensitivities            

 

 CBC No Diff  2017  White Blood Count  6.6 10^3/uL    3.5-10.8  1









 Red Blood Count  5.01 10^6/uL    4.0-5.4  1

 

 Hemoglobin  13.2 g/dL    12.0-16.0  1

 

 Hematocrit  40 %    35-47  1

 

 Mean Corpuscular Volume  80 fL    80-97  1

 

 Mean Corpuscular Hemoglobin  26 pg  Low  27-31  1

 

 Mean Corpuscular HGB Conc  33 g/dL    31-36  1

 

 Red Cell Distribution Width  15 %    10.5-15  1

 

 Platelet Count  252 10^3/uL    150-450  1

 

 Mean Platelet Volume  8 um3    7.4-10.4  1









 Inr/Protime  2017  Inr  0.88    0.77-1.02  1, 4

 

 Laboratory test finding  2017  Partial Thrombo Time  28.3 seconds    26.0
-36.3  1, 5



     PTT        

 

 Type & Screen  2017  Patient Blood Type  B Positive      1









 Antibody Screen  NEGATIVE      1









 Basic Metabolic Panel  2012  Sodium  136 mmol/L    135-145  









 Potassium  4.5 mmol/L    3.5-5.0  

 

 Chloride  106 mmol/L    101-111  

 

 Co2 (Carbon Dioxide)  24.0 mmol/L    22-32  

 

 Anion Gap  6.0 mmol/L    2-11  6

 

 Glucose  95 mg/dL      

 

 BUN  23 mg/dL    6-24  

 

 Creatinine  0.8 mg/dL    0.50-1.40  

 

 One Over Creatinine  1.25      

 

 BUN/Creatinine Ratio  28.8  High  8-20  

 

 Calcium  8.8 mg/dL    8.1-9.9  

 

 eGFR Non-  72.9    > 60  

 

 eGFR   93.8    > 60  7









 1  AA 1/2

 

 2  *******Because ethnic data is not always readily available,



   this report includes an eGFR for both -Americans and



   non- Americans.****



   The National Kidney Disease Education Program (NKDEP) does



   not endorse the use of the MDRD equation for patients that



   are not between the ages of 18 and 70, are pregnant, have



   extremes of body size, muscle mass, or nutritional status,



   or are non- or non-.



   According to the National Kidney Foundation, irrespective of



   diagnosis, the stage of the disease is based on the level of



   kidney function:



   Stage Description                      GFR(mL/min/1.73 m(2))



   1     Kidney damage with normal or decreased GFR       90



   2     Kidney damage with mild decrease in GFR          60-89



   3     Moderate decrease in GFR                         30-59



   4     Severe decrease in GFR                           15-29



   5     Kidney failure                       <15 (or dialysis)

 

 3  SEE RESULT BELOW



   -----------------------------------------------------------------------------
---------------



   Name:  ABHI VU               : 1950    Attend Dr: Niesha Garcia MD



   Acct:  [id number]  Unit: [id number]  AGE: 67            Location:  [facility]



   Re17                        SEX: F             Status:    REG REF



   -----------------------------------------------------------------------------
---------------



   



   SPEC: [id number]         HALLEY:   0    [facility] DR: Niesha Garcia MD



   REQ:  [id number]              RECD:   



   STATUS: JESS JOSHI DR: Celia Linn MD



   _



   SOURCE: URINE          SPDESC:



   ORDERED:  Urine Culture



   COMMENTS: WOLFGANG 



   QUERIES:  Urine Source: Clean Catch



   



   -----------------------------------------------------------------------------
---------------



   Procedure                         Result                         Reported   
        Site



   -----------------------------------------------------------------------------
---------------



   Urine Culture  Final                                             17-
1258      ML



   No Growth (<1,000 CFU/mL)



   



   -----------------------------------------------------------------------------
---------------



   * ML - MAIN LAB (PSC1)



   .



   



   



   



   



   



   



   



   



   



   



   



   



   



   



   



   



   



   



   



   



   



   



   



   



   



   ** END OF REPORT **



   



   * ML=Testing performed at Main Lab



   DEPARTMENT OF PATHOLOGY,  [address] [place] [address]



   Phone # [phone]      Fax #[phone]



   Amari Christie M.D. Director     [facility] # [id number]

 

 4  Please note the change in INR reference range effective



   17.

 

 5  AA 1/2

 

 6  Anion gap measurement may be of limited value in the



   presence of any alkalosis, especially in a combined acid



   base disorder.



   .

 

 7  *******Because ethnic data is not always readily available,



   this report includes an eGFR for both -Americans and



   non- Americans.****



   The National Kidney Disease Education Program (NKDEP) does



   not endorse the use of the MDRD equation for patients that



   are not between the ages of 18 and 70, are pregnant, have



   extremes of body size, muscle mass, or nutritional status,



   or are non- or non-.



   According to the National Kidney Foundation, irrespective of



   diagnosis, the stage of the disease is based on the level of



   kidney function:



   Stage Description                      GFR(mL/min/1.73 m(2))



   1     Kidney damage with normal or decreased GFR       90



   2     Kidney damage with mild decrease in GFR          60-89



   3     Moderate decrease in GFR                         30-59



   4     Severe decrease in GFR                           15-29



   5     Kidney failure                       <15 (or dialysis)







Procedures







 Date  CPT Code  Description  Status

 

 10/19/2018  58953  EKG Tracing & Interpretation  Completed

 

 2018  96122  TKR Total Knee Replacement  Completed

 

 2018  45269  TKR Total Knee Replacement  Completed

 

 2017  29269  EKG, Interpretation Only  Completed

 

 2017  47169  ECHO Transthoracic, Real-Time 2D With Doppler And Color  
Completed



     Flow  

 

 2017  82356  ECHO Transthoracic, Real-Time 2D With Doppler And Color  
Completed



     Flow  

 

 11/10/2017    Mammogram  Completed

 

 2017  74617  Inject/Drain Joint/Bursa Major W/O US  Completed

 

 09/15/2017  32318  EKG Tracing & Interpretation  Completed

 

 2016  73125  EKG Tracing & Interpretation  Completed

 

 2016    Mammogram  Completed

 

 03/15/2016    Mammogram  Completed

 

 2016  20593  EKG Tracing & Interpretation  Completed

 

 2015    Mammogram  Completed

 

 2015  65243  EKG Tracing & Interpretation  Completed

 

 2015    Mammogram  Completed

 

 2015    Mammogram  Completed

 

 2015    Mammogram  Completed

 

 2014  02310  EKG Tracing & Interpretation  Completed

 

 01/10/2014  71211  ECHO Transthoracic, Real-Time 2D With Doppler And Color  
Completed



     Flow  

 

 2013  86073  EKG Tracing & Interpretation  Completed

 

 2013  02498  ECHO Transthoracic, Real-Time 2D With Doppler And Color  
Completed



     Flow  

 

 2013  14340  EKG Tracing & Interpretation  Completed

 

 01/15/2013  50816  ECHO Transthoracic, Real-Time 2D With Doppler And Color  
Completed



     Flow  

 

 2013  77741  ECHO Stress Test Incl Perf Contiuous ekg Monitoring  
Completed



     W/Phys Superv  

 

 2013  25342  ECHO Transthoracic, Real-Time 2D With Doppler And Color  
Completed



     Flow  

 

 2012  86313  EKG Tracing & Interpretation  Completed

 

 2012  02788  ECHO Transthoracic, Real-Time 2D With Doppler And Color  
Completed



     Flow  

 

 2011  21046  EKG Tracing & Interpretation  Completed

 

 10/04/2010  95092  ECHO Transthoracic, Real-Time 2D With Doppler And Color  
Completed



     Flow  

 

 10/06/2009  88170  ECHO Stress Test Incl Perf Contiuous ekg Monitoring  
Completed



     W/Phys Superv  

 

 10/06/2009  31198  ECHO Stress Test Incl Perf Contiuous ekg Monitoring  
Completed



     W/Phys Superv  

 

 2009  98953  ECHO Transthoracic, Real-Time 2D With Doppler And Color  
Completed



     Flow  

 

 2009  97180  EKG Tracing & Interpretation  Completed

 

 2009  79453  EKG, Interpretation Only  Completed

 

 2006  57536  Color Doppler  Completed

 

 2006  17838  Pulse Doppler & Continuous Wave  Completed

 

 2006  89617  Echocardiogram  Completed







Encounters







 Type  Date  Location  Provider  CPT E/M  Dx

 

 Office Visit  2018 10:15a  [facility]  Niesha Garcia M.D.  
17177  Z47.1



     [facility]      









 Z96.651









 Office Visit  2017 10:00a  [facility]  Amelia Saini MD  
89322  N64.59



     [facility]      

 

 Office Visit  2017 11:40a  [facility]  Qutaybeh SBhaskar  17338  I10



       ISABELA Garcai    









 I34.0

 

 I36.1









 Office Visit  2017  8:30a  [facility]  Niesha Garcia M.D.  
82068  M25.561



     C.M.A.      









 M25.562

 

 M25.461

 

 M25.462

 

 M17.0

 

 M16.0

 

 M25.551

 

 M25.552









 Office Visit  09/15/2017  2:00p  [facility]  Leticiaybdora Garcia  
38669  I10



       M.D.    









 I34.0

 

 I36.1

 

 I45.10









 Office Visit  2017  3:15p  [facility]  Velvet Hanson,  11132  
B02.22



     [facility]    

 

 Office Visit  2017 10:00a  [facility]  Amelia Saini MD  
63716  N64.59



     [facility]      

 

 Office Visit  12/15/2016  9:30a  [facility]  Nurse Visit   26791  I10

 

 Office Visit  2016  4:20p  [facility]  Qutaybeh SBhaskar  00929  
I34.0



     Trae Garcia M.D.    









 I45.10

 

 I10

 

 I36.1

 

 E66.9









 Office Visit  11/10/2016  9:45a  [facility]  Velvet WHITMOREBhaskar Margie,  45736  
B02.22



     [facility]  M.D.    

 

 Office Visit  2016  9:45a  [facility]  Velvet WHITMOREBhaskar Babcockkeli,  46737  
B02.22



     [facility]  M.D.    

 

 Office Visit  2016  1:40p  [facility]  Qutaybeh S.  37922  I34.0



       ISABELA Garcia    









 I45.2

 

 I10

 

 R94.31









 Office Visit  2015  3:40p  [facility]  Qutaybeh S. Maghaydah,  
60730  424.0



       M.D.    









 426.51

 

 401.9

 

 794.31









 Office Visit  2014  2:40p  [facility]  Qutaybeh S. Maghaydah,  
76121  424.0



       M.D.    









 426.51

 

 401.9

 

 794.31









 Office Visit  2013 10:20a  [facility]  Qutaybeh S. Maghaydah,  
32916  401.9



       M.D.    









 794.31

 

 424.0

 

 424.2

 

 426.51

 

 785.2









 Office Visit  2013  9:40a  [facility]  Qutaybeh S. Maghaydah,  
88141  401.9



       M.D.    









 794.31

 

 424.0

 

 424.2

 

 426.51









 Office Visit  2013  3:30p  [facility]  Qutaybeh S. Maghaydah,  
94476  401.9



       M.D.    









 794.31

 

 786.50









 Office Visit  2012  1:30p  [facility]  Lauren Valenzuela, N.P.  
03414  401.9

 

 Office Visit  04/10/2012 10:00a  [facility]  Lauren Valenzuela, N.P.  
51086  401.9

 

 Office Visit  2012 10:00a  [facility]  Qutaybeh S. Maghaydah,  
70967  401.9



       M.D.    









 424.0

 

 794.31

 

 424.2









 Office Visit  2011  8:15a  [facility]  Nurse Visit   81713  
401.9

 

 Office Visit  2011  3:40p  [facility]  Qutaybeh S. Maghaydah,  
30987  401.9



       M.D.    









 424.0

 

 794.31









 Office Visit  2009 10:30a  [facility]  Nurse Visit cc  26134  

 

 Office Visit  2009  9:20a  [facility]  Qutaybeh S. Maghaydah,  
43653  401.9



       M.D.    









 424.0

 

 424.2









 Office Visit  2009  2:20p  [facility]  Qutaybeh S. Maghaydah,  
75233  401.9



       M.D.    









 785.2

 

 424.0

 

 424.2

 

 780.4

 

 V72.81

 

 794.31







Plan of Care

Future Appointment(s):10/22/2019 10:30 am - Duy Farmer MD at 
[id number] 10:45 am - Amelia Saini MD at [facility]2018 11:30 am - Niesha Garcia M.D. at [facility]..10/26/2018  8:30 am - Niesha Garcia M.D. at [facility]..10/22/2018 - Duy Farmer MDG50.0 Trigeminal 
neuralgiaComments:Discussed in detail the concerns that facial pain could 
trigger her neuralgia and so avoiding dentalprocedures where possible and 
treating flares of neuralgia early would help to keep things quiet.  Discussed 
that there are procedures to do for trigeminal neuralgia that could possibly 
decrease the need for lyrica which is affecting her slightly but since the 
sympotms from lyrica are slight she does nto want to try a procedure such as 
injection or to try gabapentin or other meds instead.I will see yearly but she 
will have a low threshhold for calling with flaresFollow up:1 YEARG25.0 
Essential tremorComments:Has a mild intention tremor that I discussed is 
usually familial and benign but if gets very symptomatic then switching 
atenolol to inderal may help.  Discussed triggers of being nervous, caffeine or 
hypoglycemia can exacerbate.

## 2018-11-08 NOTE — XMS REPORT
Abhi Vu

 Created on:2018



Patient:Abhi Vu

Sex:Female

:1950

External Reference #:[id number]





Demographics







 Address  [address]



   [place] [address]

 

 Home Phone  [phone]

 

 Mobile Phone  [phone]

 

 Email Address  [email or web address]

 

 Preferred Language  English

 

 Marital Status  Not  Or 

 

 Catholic Affiliation  Unknown

 

 Race  White

 

 Ethnic Group  Not  Or 









Author







 Organization  [employer]

 

 Address  [address]



   [place] [address]

 

 Phone  [phone]









Support







 Name  Relationship  Address  Phone

 

 Amari Vu  Unavailable  Unavailable  [phone]









Care Team Providers







 Name  Role  Phone

 

 Celia Linn MD  Primary Care Physician  Unavailable









Payers







 Type  Date  Identification Numbers  Payment Provider  Subscriber

 

 Health Maintenance    Policy Number:  Medicare Blue Ppo  Abhi Vu



 [facility] (O)    [id number]    









 Group Number: [id number]  [address] 

 

 PayID:   SHAYY Saldaña [id number]









 Medigap Part B  Effective:  Policy Number:  FRANKO Rodarte  Abhi WHITMORE Patricialucia



   2014  [id number]    









 Expires: 2015  PayID: [id number]  [facility] [id number]









 SHAYY Saldaña [id number]









 Medigap Part B  Effective:  Policy Number:  FRANKO Rodarte  Abhi WHITMORE Patricialucia



   2011  [id number]    









 Expires: 2013  PayID: [id number]  [place] [id number]









 [place]AY[place]da[place] [id number]







Problems







 Date  Description  Provider  Status

 

 Onset: 2012  Essential hypertension  Qutaybeh S. Maghaydah, Active M.D.  

 

 Onset: 2012  Mitral valve disorder  Qutaybeh S. Maghaydah, Active M.D.  

 

 Onset: 2012  Tricuspid valve disorder,  Qutaybeh S. Maghaydah,  Active



   non-rheumatic  M.DBhaskar  

 

 Onset: 2013  Electrocardiogram abnormal  Qutaybeh S. Maghaydah, Active M.D.  

 

 Onset: 2013  Chest pain  Qutaybeh S. Maghaydah,  Active



     M.D.  

 

 Onset: 2013  Right bundle branch block AND left  Qutaybeh S. Maghaydah, Active



   posterior fascicular block  HAIM.MADELEINE  

 

 Onset: 01/10/2014  Heart murmur  [facility]  Active

 

 Onset: 2016  Post-herpetic trigeminal neuralgia  Velvet Hanson M.D.  
Active

 

 Onset: 2017  Localized, primary osteoarthritis  Niesha Garcia M.D.  
Active

 

 Onset: 2017  Localized, primary osteoarthritis  Niesha Garcia M.D.  
Active



   of the pelvic region and thigh    

 

 Onset: 2018  Arthroplasty of knee  Niesha Garcia M.D.  Active

 

 Onset: 2018  Aftercare following joint  Niesha Garcia M.D.  Active



   replacement surgery    







Family History







 Date  Family Member(s)  Problem(s)  Comments

 

   General  Heart Disease  

 

   General  Hypertension  

 

   General  Cancer  

 

   Father   due to MI x3  () - age 51 yr

 

   Mother   due to Natural Causes  ()

 

   Paternal Grandfather   due to influenza  ()

 

   Paternal Grandmother   due to Influenza  ()

 

   Maternal Grandfather   due to Lung disease  ()

 

   Maternal Grandmother   due to drowned  ()







Social History







 Type  Date  Description  Comments

 

 Marital Status      

 

 Lives With    Spouse  

 

 Occupation    consultant  

 

 Cigarette Use    Never Smoked Cigarettes  

 

 ETOH Use    Rarely consumes alcohol  

 

 Smoking    Patient has never smoked  

 

 Recreational Drug Use    Denies Drug Use  

 

 Daily Caffeine    Consumes on average 2 cups of  and ice tea



     hot tea per day  

 

 Enjoy Exercising    Enjoys exercising  Walking, water aerobics

 

 Exercise Type/Frequency    Walks daily  36067 steps per day

 

 Exercise Type/Frequency    Swims 2 times a week  Aqua deonna







Allergies, Adverse Reactions, Alerts







 Date  Description  Reaction  Status  Severity  Comments

 

 2009  Ampicillin    active    hives

 

 2009  Codeine Phosphate    active    N/V

 

 2009  Hay Fever    active    

 

 2009  Oxcarbazepine    active    rash

 

 2009  Carbamazepine    active    rash

 

 2017  Amoxicillin    active    







Medications







 Medication  Date  Status  Form  Strength  Qnty  SIG  Indications  Ordering



                 Provider

 

 Amoxicillin    Active  Capsules  500mg  4caps  take 4              pills, 2    ISABELA Garcia



             g 1 hour    



             before    



             dental or    



             gi    



             procedure    

 

 Lyrica    Active  Capsules  50mg  30cap  1 tab by    



   /Krista Farmer MD



             daily as    



             needed    

 

 Hydrochlorothiaz    Active  Tablets  25mg  90tab  take one  I10  Qutaybeh 
S.



         s  tablet by    adarsh Garcia M.D.



             every day    

 

 Cymbalta    Active  Caps DR  30mg  60cap  take two  B02.22  oph    Part    s  capsules    ISABELA Hernandez



             by mouth    



             every day    

 

 Atenolol    Active  Tablets  25mg  30tab  1/2  po    Unknown



           s  qd    

 

 Lyrica    Active  Capsules  100mg  60cap  1 by    Lauren



   /        s  mouth    Barbara,



             daily in    M.D.



             the    



             evening    



             and one    



             in the    



             morning    

 

 Ibuprofen    Active    200mg    200    Unknown



             mg-400 mg    



             daily prn    

 

                 

 

 Tizanidine HCL    Hx  Capsules  4mg  60cap  take one            s  tab twice    ISABELA Garcia



   -          daily as    



             needed.    



                 

 

 Cyclobenzaprine    Hx  Tablets  10mg  60tab  take 1  Z47.1  Niesha



 HCL  /2018        s  tab by    ISABELA Garcia



   -          mouth 2-3    



             times           day as    



             needed    

 

 Metaxalone    Hx  Tablets  800mg  60tab  take one    Niesha        s  tab 2-3    ISABELA Garcia



   -          times a    



             day as    



   /2018          needed    

 

 Percocet    Hx  Tablets  5-325mg  90tab  1 -2 tabs            s  by adarsh Garcia M.D.



   -          every 4-6    



             hours as    



   /2018          needed    



             pain    

 

 Coumadin    Hx  Tablets  2mg  60tab  take 1            s  tab by    ISABELA Garcai



   -          mouth    



   02/11          every    



   /2018          night at    



             bedtime    



             or as    



             directed    



             by md    



             through    



             visiting    



             nurse.    



             Do not    



             take    



             prior to    



             surgery.    

 

 Stool Softener    Hx  Capsules  100mg  90cap  1 tab po    Niesha        s  2-3 times    ISABELA Garcia



   -          daily    



             while on    



             narcotic    



             pain    



             medicine.    

 

 Lyrica    Hx  Capsules  100mg  60cap  one twice    Velvet HERRERA



   /Jm        s  a day    Brian Hanson M.D.



                 

 

 Lyrica    Hx  Capsules  50mg  60cap  1 by    Velvet Spencer2016        s  mouth per    Margie,



   -          day as    M.DBhaskar



             needed in    



             addition    



             to 100mg    



             tabs    

 

 Hydrochlorothiaz    Hx  Tablets  25mg  90tab  1 po qd    Other



         s      Ordering



   -              Provider



                 

 

 Cozaar    Hx  Tablets  50mg  60tab  1 pill by    Kailey        s  mouth    Hadley,



   -          twice per    D.O.



   11/27          day    



   /2013              

 

 Lisinopril    Hx  Tablets  5mg  30tab  1 po qd    Qutaybeh S.



           Brian Jade M.D.



                 

 

 Norvasc    Hx  Tablets  10mg  90tab  1 po qd    Qutaybeh S.



           Brian Jade M.D.



                 

 

 Zyrtec    Hx  Chewtabs  10mg  30uni  1 tab po    Qutaybeh         ts  qd    Brian Garcia M.D.



                 

 

 Norvasc    Hx  Tablets  5mg  30tab  1 po qd    Qutaybeh S.



           Brian Jade M.D.



                 

 

 Lyrica  00/  Hx  Capsules  150mg  60cap  1 po bid    Unknown



   /0000        s  and 1 qhs    



   -              



                 

 

 Multi Vitamin  00/00  Hx  Tablets      1 po qd    Unknown



   /0000              



   -              



                 

 

 Fish Oil    Hx  Capsules  1000mg    1 po qd    Unknown



   /0000              



   -              



                 

 

 Lyrica  00/00  Hx  Capsules  150mg    1 po    Unknown



   /0000          daily in    



   -          the    



   12/29          morning    



   /2016              

 

 Cipro  00/  Hx  Tablets  500mg  20tab  1 po qd x    Unknown



   /0000        s  3 days    



   -              



   04/10              



   /2012              

 

 Vitamin B6  00/00  Hx  Tablets  250mg    2 po qd    Unknown



   /0000              



   -              



                 

 

 Magnesium  00/00  Hx        1 spray    Unknown



   /0000          qd on    



   -          abdomen    



                 

 

 Ibuprofen  00/00  Hx  Tablets  400mg    by mouth    Unknown



   /0000          every 4    



   -          to 6    



             hours as    



   /          needed    

 

 Aspir-81  00/00  Hx  Tablets DR  81mg    1 by    Unknown



   /0000          mouth    



   -          every day    



                 

 

 Claritin  00/00  Hx  Capsules  10mg    1 by    Unknown



   /0000          mouth    



   -          every day    



             as needed    



   /2018              

 

 Ciprofloxacin  00/00  Hx  Tablets  500mg        Husseini,



 HCL  /0000              MD García



   -              



   10/31              



   /2017              

 

 Aspirin  00  Hx    325mg        Unknown



   /0000              



   -              



   10/18              



   /2018              

 

 Duloxetine HCL    Hx  Caps DR  30mg    2 by    Unknown



   /0000    Part      mouth    



   -          every day    



                 

 

 Pneumococcal,Uns    Active  Injection          Unknown



 pecified  /0000              







Medications Administered in Office







 Medication  Date  Status  Form  Strength  Qnty  SIG  Indications  Ordering



                 Provider

 

 Depomedrol    Administered  Injection          Niesha



 40MG  017              ISABELA Garcia

 

 Depomedrol    Administered  Injection          Niesha



 40MG  017              ISABELA Garcia







Vital Signs







 Date  Vital  Result  Comment

 

 10/19/2018  Height  62 inches  5'2"









 Weight  244.50 lb  with shoes

 

 Heart Rate  74 /min  

 

 BP Systolic  164 mmHg  

 

 BP Diastolic  88 mmHg  

 

 BMI (Body Mass Index)  44.7 kg/m2  

 

 Ejection Fraction  60-65%  ECHO. 2017  Height  62 inches  5'2"









 Weight  228.00 lb  

 

 BP Systolic  138 mmHg  

 

 BP Diastolic  84 mmHg  

 

 Body Temperature  98.1 F  

 

 BMI (Body Mass Index)  41.7 kg/m2  









 2018  Height  62 inches  5'2"









 Weight  225.00 lb  

 

 BP Systolic  144 mmHg  

 

 BP Diastolic  84 mmHg  

 

 Body Temperature  97.8 F  

 

 Pain Level  0  

 

 BMI (Body Mass Index)  41.1 kg/m2  









 2018  Height  63 inches  5'3"









 Weight  236.00 lb  per pt

 

 Heart Rate  64 /min  reg

 

 BP Systolic Sitting  144 mmHg  Lue

 

 BP Diastolic Sitting  90 mmHg  Lue

 

 Respiratory Rate  16 /min  

 

 Pain Level  2  right knee

 

 BMI (Body Mass Index)  41.8 kg/m2  









 2017  Height  63 inches  5'3"









 Weight  236.00 lb  

 

 Heart Rate  78 /min  

 

 BP Systolic Sitting  180 mmHg  

 

 BP Diastolic Sitting  90 mmHg  

 

 Body Temperature  98.5 F  

 

 BMI (Body Mass Index)  41.8 kg/m2  









 2017  Height  63 inches  5'3"









 Weight  228.00 lb  

 

 Heart Rate  72 /min  

 

 BP Systolic  138 mmHg  

 

 BP Diastolic  82 mmHg  

 

 Respiratory Rate  16 /min  

 

 Body Temperature  98.5 F  

 

 BMI (Body Mass Index)  40.4 kg/m2  









 2017  Height  63 inches  5'3"









 Weight  228.00 lb  with shoes

 

 Heart Rate  58 /min  

 

 BP Systolic Sitting  166 mmHg  LA lrg cuff

 

 BP Diastolic Sitting  104 mmHg  LA lrg cuff

 

 BMI (Body Mass Index)  40.4 kg/m2  

 

 Ejection Fraction  60%-65%  echo 01/15/13









 2017  Height  63 inches  5'3"









 Weight  225.00 lb  

 

 Heart Rate  61 /min  

 

 BP Systolic  147 mmHg  

 

 BP Diastolic  73 mmHg  

 

 BMI (Body Mass Index)  39.9 kg/m2  









 09/15/2017  Height  63 inches  5'3"









 Weight  227.00 lb  w/ shoes

 

 Heart Rate  66 /min  reg

 

 BP Systolic Sitting  150 mmHg  Rue, lg cuff

 

 BP Diastolic Sitting  94 mmHg  Rue, lg cuff

 

 Respiratory Rate  16 /min  

 

 BMI (Body Mass Index)  40.2 kg/m2  

 

 Ejection Fraction  60-65%  as of 2013 echo









 2017  Height  63 inches  5'3"









 Weight  230.00 lb  

 

 Heart Rate  68 /min  

 

 BP Systolic  156 mmHg  

 

 BP Diastolic  84 mmHg  

 

 Respiratory Rate  16 /min  

 

 Pain Level  2  

 

 BMI (Body Mass Index)  40.7 kg/m2  









 2017  Height  63 inches  5'3"









 Weight  217.00 lb  

 

 Heart Rate  66 /min  

 

 BP Systolic  142 mmHg  

 

 BP Diastolic  86 mmHg  

 

 Respiratory Rate  18 /min  

 

 Body Temperature  98.4 F  

 

 BMI (Body Mass Index)  38.4 kg/m2  









 12/15/2016  Heart Rate  61 /min  









 BP Systolic  132 mmHg  LA, large

 

 BP Diastolic  80 mmHg  LA, large

 

 BP Systolic Sitting  152 mmHg  LA, home unit

 

 BP Diastolic Sitting  78 mmHg  LA, home unit









 2016  Height  64 inches  5'4"









 Weight  222.00 lb  without shoes

 

 Heart Rate  76 /min  

 

 BP Systolic Sitting  160 mmHg  Rue reg cuff

 

 BP Diastolic Sitting  86 mmHg  Rue reg cuff

 

 BP Systolic Standing  156 mmHg  Rue reg cuff

 

 BP Diastolic Standing  80 mmHg  Rue reg cuff

 

 Respiratory Rate  17 /min  

 

 BMI (Body Mass Index)  38.1 kg/m2  









 11/10/2016  Height  64 inches  5'4"









 Weight  218.00 lb  

 

 Heart Rate  56 /min  

 

 BP Systolic Sitting  132 mmHg  

 

 BP Diastolic Sitting  86 mmHg  

 

 Respiratory Rate  14 /min  

 

 BMI (Body Mass Index)  37.4 kg/m2  









 2016  Height  64 inches  5'4"









 Weight  220.00 lb  

 

 Heart Rate  56 /min  

 

 BP Systolic Sitting  108 mmHg  

 

 BP Diastolic Sitting  70 mmHg  

 

 Respiratory Rate  14 /min  

 

 BMI (Body Mass Index)  37.8 kg/m2  









 2016  Height  64 inches  5'4"









 Weight  221.00 lb  w/o shoes

 

 Heart Rate  60 /min  

 

 BP Systolic Sitting  118 mmHg  LA lg cuff

 

 BP Diastolic Sitting  90 mmHg  LA lg cuff

 

 BMI (Body Mass Index)  37.9 kg/m2  

 

 Ejection Fraction  60-65  echo 1/15/13









 2015  Height  64 inches  5'4"









 Weight  237.00 lb  

 

 Heart Rate  70 /min  

 

 BP Systolic  146 mmHg  LA large

 

 BP Diastolic  78 mmHg  LA large

 

 BMI (Body Mass Index)  40.7 kg/m2  

 

 Ejection Fraction  60-65%  1/15/13 ECHO









 2014  Height  64 inches  5'4"









 Weight  240.25 lb  

 

 Heart Rate  64 /min  

 

 BP Systolic Sitting  144 mmHg  LA lg cuff

 

 BP Diastolic Sitting  82 mmHg  LA lg cuff

 

 Respiratory Rate  16 /min  

 

 BMI (Body Mass Index)  41.2 kg/m2  









 2013  Height  64 inches  5'4"









 Weight  228.00 lb  

 

 Heart Rate  68 /min  

 

 BP Systolic Sitting  130 mmHg  

 

 BP Diastolic Sitting  82 mmHg  

 

 Respiratory Rate  16 /min  

 

 BMI (Body Mass Index)  39.1 kg/m2  









 2013  Height  64 inches  5'4"









 Weight  247.00 lb  

 

 Heart Rate  58 /min  

 

 BP Systolic  118 mmHg  

 

 BP Diastolic  68 mmHg  

 

 BMI (Body Mass Index)  42.4 kg/m2  









 2012  Height  64 inches  5'4"









 Weight  260.00 lb  

 

 Heart Rate  70 /min  

 

 BP Systolic  128 mmHg  

 

 BP Diastolic  70 mmHg  

 

 BP Systolic Sitting  108 mmHg  pulse 80

 

 BP Diastolic Sitting  62 mmHg  pulse 80

 

 BP Systolic Standing  118 mmHg  pulse 84

 

 BP Diastolic Standing  70 mmHg  pulse 84

 

 BP Systolic Lying Down  116 mmHg  pulse 64

 

 BP Diastolic Lying Down  60 mmHg  pulse 64

 

 BMI (Body Mass Index)  44.6 kg/m2  









 04/10/2012  Height  64 inches  5'4"









 Weight  259.75 lb  

 

 Heart Rate  65 /min  

 

 BP Systolic Sitting  160 mmHg  home unit: 161/94

 

 BP Diastolic Sitting  80 mmHg  home unit: 161/94

 

 BMI (Body Mass Index)  44.6 kg/m2  









 2012  Height  64 inches  5'4"









 Weight  261.00 lb  

 

 Heart Rate  49 /min  

 

 BP Systolic Sitting  152 mmHg  

 

 BP Diastolic Sitting  90 mmHg  

 

 BMI (Body Mass Index)  44.8 kg/m2  









 2011  Height  64 inches  5'4"









 Weight  265.00 lb  

 

 Heart Rate  60 /min  

 

 BP Systolic Sitting  164 mmHg  L

 

 BP Diastolic Sitting  80 mmHg  L

 

 BMI (Body Mass Index)  45.5 kg/m2  









 2009  Height  64 inches  5'4"









 Weight  259.00 lb  

 

 Heart Rate  60 /min  

 

 BP Systolic Sitting  138 mmHg  

 

 BP Diastolic Sitting  82 mmHg  

 

 BMI (Body Mass Index)  44.5 kg/m2  









 2009  Height  64 inches  5'4"









 Weight  257.00 lb  

 

 Heart Rate  60 /min  

 

 BP Systolic Sitting  158 mmHg  L

 

 BP Diastolic Sitting  70 mmHg  L

 

 BMI (Body Mass Index)  44.1 kg/m2  









 2009  Height  64 inches  5'4"









 Weight  263.00 lb  

 

 Heart Rate  53 /min  

 

 BP Systolic Sitting  130 mmHg  

 

 BP Diastolic Sitting  80 mmHg  

 

 BP Systolic Standing  140 mmHg  

 

 BP Diastolic Standing  90 mmHg  

 

 BP Systolic Lying Down  130 mmHg  

 

 BP Diastolic Lying Down  80 mmHg  

 

 Respiratory Rate  16 /min  

 

 BMI (Body Mass Index)  45.1 kg/m2  







Results







 Test  Date  Test  Result  H/L  Range  Note

 

 Urinalysis Profile  2017  Urine Color  Yellow      1









 Urine Appearance  Clear      1

 

 Urine Specific Gravity  1.013    1.010-1.030  1

 

 Urine pH  6.0    5-9  1

 

 Urine Urobilinogen  Negative    Negative  1

 

 Urine Ketones  Negative    Negative  1

 

 Urine Protein  Negative    Negative  1

 

 Urine Leukocytes  Negative    Negative  1

 

 Urine Blood  Negative    Negative  1

 

 Urine Nitrite  Negative    Negative  1

 

 Urine Bilirubin  Negative    Negative  1

 

 Urine Glucose  Negative    Negative  1









 Comp Metabolic Panel  2017  Sodium  137 mmol/L    133-145  1









 Potassium  4.3 mmol/L    3.5-5.0  1

 

 Chloride  103 mmol/L    101-111  1

 

 Co2 Carbon Dioxide  29 mmol/L    22-32  1

 

 Anion Gap  5 mmol/L    2-11  1

 

 Glucose  91 mg/dL      1

 

 Blood Urea Nitrogen  19 mg/dL    6-24  1

 

 Creatinine  0.79 mg/dL    0.51-0.95  1

 

 BUN/Creatinine Ratio  24.1  High  8-20  1

 

 Calcium  9.2 mg/dL    8.6-10.3  1

 

 Total Protein  6.9 g/dL    6.4-8.9  1

 

 Albumin  4.2 g/dL    3.2-5.2  1

 

 Globulin  2.7 g/dL    2-4  1

 

 Albumin/Globulin Ratio  1.6    1-3  1

 

 Total Bilirubin  0.50 mg/dL    0.2-1.0  1

 

 Alkaline Phosphatase  88 U/L      1

 

 Alt  8 U/L    7-52  1

 

 Ast  17 U/L    13-39  1

 

 Egfr Non-  72.8    >60  1

 

 Egfr   93.6    >60  1, 2









 Urine Culture And  2017  Urine Culture  SEE RESULT BELOW      1, 3



 Sensitivities            

 

 CBC No Diff  2017  White Blood Count  6.6 10^3/uL    3.5-10.8  1









 Red Blood Count  5.01 10^6/uL    4.0-5.4  1

 

 Hemoglobin  13.2 g/dL    12.0-16.0  1

 

 Hematocrit  40 %    35-47  1

 

 Mean Corpuscular Volume  80 fL    80-97  1

 

 Mean Corpuscular Hemoglobin  26 pg  Low  27-31  1

 

 Mean Corpuscular HGB Conc  33 g/dL    31-36  1

 

 Red Cell Distribution Width  15 %    10.5-15  1

 

 Platelet Count  252 10^3/uL    150-450  1

 

 Mean Platelet Volume  8 um3    7.4-10.4  1









 Inr/Protime  2017  Inr  0.88    0.77-1.02  1, 4

 

 Laboratory test finding  2017  Partial Thrombo Time  28.3 seconds    26.0
-36.3  1, 5



     PTT        

 

 Type & Screen  2017  Patient Blood Type  B Positive      1









 Antibody Screen  NEGATIVE      1









 Basic Metabolic Panel  2012  Sodium  136 mmol/L    135-145  









 Potassium  4.5 mmol/L    3.5-5.0  

 

 Chloride  106 mmol/L    101-111  

 

 Co2 (Carbon Dioxide)  24.0 mmol/L    22-32  

 

 Anion Gap  6.0 mmol/L    2-11  6

 

 Glucose  95 mg/dL      

 

 BUN  23 mg/dL    6-24  

 

 Creatinine  0.8 mg/dL    0.50-1.40  

 

 One Over Creatinine  1.25      

 

 BUN/Creatinine Ratio  28.8  High  8-20  

 

 Calcium  8.8 mg/dL    8.1-9.9  

 

 eGFR Non-  72.9    > 60  

 

 eGFR   93.8    > 60  7









 1  AA /

 

 2  *******Because ethnic data is not always readily available,



   this report includes an eGFR for both -Americans and



   non- Americans.****



   The National Kidney Disease Education Program (NKDEP) does



   not endorse the use of the MDRD equation for patients that



   are not between the ages of 18 and 70, are pregnant, have



   extremes of body size, muscle mass, or nutritional status,



   or are non- or non-.



   According to the National Kidney Foundation, irrespective of



   diagnosis, the stage of the disease is based on the level of



   kidney function:



   Stage Description                      GFR(mL/min/1.73 m(2))



   1     Kidney damage with normal or decreased GFR       90



   2     Kidney damage with mild decrease in GFR          60-89



   3     Moderate decrease in GFR                         30-59



   4     Severe decrease in GFR                           15-29



   5     Kidney failure                       <15 (or dialysis)

 

 3  SEE RESULT BELOW



   -----------------------------------------------------------------------------
---------------



   Name:  ABHI VU               : 1950    Attend Dr: Niesha Garcia MD



   Acct:  [id number]  Unit: [id number]  AGE: 67            Location:  [facility]



   Re17                        SEX: F             Status:    REG REF



   -----------------------------------------------------------------------------
---------------



   



   SPEC: [id number]         HALLEY:       [facility] DR: Niesha Garcia MD



   REQ:  [id number]              RECD:   



   STATUS: EJSS JOSHI DR: Celia Linn MD



   _



   SOURCE: URINE          SPDESC:



   ORDERED:  Urine Culture



   COMMENTS: WOLFGANG 



   QUERIES:  Urine Source: Clean Catch



   



   -----------------------------------------------------------------------------
---------------



   Procedure                         Result                         Reported   
        Site



   -----------------------------------------------------------------------------
---------------



   Urine Culture  Final                                             17-
1258      ML



   No Growth (<1,000 CFU/mL)



   



   -----------------------------------------------------------------------------
---------------



   * ML - MAIN LAB ([facility])



   .



   



   



   



   



   



   



   



   



   



   



   



   



   



   



   



   



   



   



   



   



   



   



   



   



   



   ** END OF REPORT **



   



   * ML=Testing performed at Main Lab



   DEPARTMENT OF PATHOLOGY,  [address] [place] [address]



   Phone # [phone]      Fax #[phone]



   Amari Christie M.D. Director     [facility] # [id number]

 

 4  Please note the change in INR reference range effective



   17.

 

 5  AA 

 

 6  Anion gap measurement may be of limited value in the



   presence of any alkalosis, especially in a combined acid



   base disorder.



   .

 

 7  *******Because ethnic data is not always readily available,



   this report includes an eGFR for both -Americans and



   non- Americans.****



   The National Kidney Disease Education Program (NKDEP) does



   not endorse the use of the MDRD equation for patients that



   are not between the ages of 18 and 70, are pregnant, have



   extremes of body size, muscle mass, or nutritional status,



   or are non- or non-.



   According to the National Kidney Foundation, irrespective of



   diagnosis, the stage of the disease is based on the level of



   kidney function:



   Stage Description                      GFR(mL/min/1.73 m(2))



   1     Kidney damage with normal or decreased GFR       90



   2     Kidney damage with mild decrease in GFR          60-89



   3     Moderate decrease in GFR                         30-59



   4     Severe decrease in GFR                           15-29



   5     Kidney failure                       <15 (or dialysis)







Procedures







 Date  CPT Code  Description  Status

 

 10/19/2018  76708  EKG Tracing & Interpretation  Completed

 

 2018  15130  TKR Total Knee Replacement  Completed

 

 2018  87808  TKR Total Knee Replacement  Completed

 

 2017  23678  EKG, Interpretation Only  Completed

 

 2017  70589  ECHO Transthoracic, Real-Time 2D With Doppler And Color  
Completed



     Flow  

 

 2017  55063  ECHO Transthoracic, Real-Time 2D With Doppler And Color  
Completed



     Flow  

 

 11/10/2017    Mammogram  Completed

 

 2017  52634  Inject/Drain Joint/Bursa Major W/O US  Completed

 

 09/15/2017  17753  EKG Tracing & Interpretation  Completed

 

 2016  22962  EKG Tracing & Interpretation  Completed

 

 2016    Mammogram  Completed

 

 03/15/2016    Mammogram  Completed

 

 2016  14759  EKG Tracing & Interpretation  Completed

 

 2015    Mammogram  Completed

 

 2015  99082  EKG Tracing & Interpretation  Completed

 

 2015    Mammogram  Completed

 

 2015    Mammogram  Completed

 

 2015    Mammogram  Completed

 

 2014  23212  EKG Tracing & Interpretation  Completed

 

 01/10/2014  17092  ECHO Transthoracic, Real-Time 2D With Doppler And Color  
Completed



     Flow  

 

 2013  23835  EKG Tracing & Interpretation  Completed

 

 2013  88289  ECHO Transthoracic, Real-Time 2D With Doppler And Color  
Completed



     Flow  

 

 2013  19894  EKG Tracing & Interpretation  Completed

 

 01/15/2013  52804  ECHO Transthoracic, Real-Time 2D With Doppler And Color  
Completed



     Flow  

 

 2013  20698  ECHO Stress Test Incl Perf Contiuous ekg Monitoring  
Completed



     W/Phys Superv  

 

 2013  33659  ECHO Transthoracic, Real-Time 2D With Doppler And Color  
Completed



     Flow  

 

 2012  00566  EKG Tracing & Interpretation  Completed

 

 2012  72133  ECHO Transthoracic, Real-Time 2D With Doppler And Color  
Completed



     Flow  

 

 2011  06726  EKG Tracing & Interpretation  Completed

 

 10/04/2010  41739  ECHO Transthoracic, Real-Time 2D With Doppler And Color  
Completed



     Flow  

 

 10/06/2009  94756  ECHO Stress Test Incl Perf Contiuous ekg Monitoring  
Completed



     W/Phys Superv  

 

 10/06/2009  98524  ECHO Stress Test Incl Perf Contiuous ekg Monitoring  
Completed



     W/Phys Superv  

 

 2009  09671  ECHO Transthoracic, Real-Time 2D With Doppler And Color  
Completed



     Flow  

 

 2009  68731  EKG Tracing & Interpretation  Completed

 

 2009  23479  EKG, Interpretation Only  Completed

 

 2006  17657  Color Doppler  Completed

 

 2006  26521  Pulse Doppler & Continuous Wave  Completed

 

 2006  04556  Echocardiogram  Completed







Encounters







 Type  Date  Location  Provider  CPT E/M  Dx

 

 Office Visit  2018 10:15a  [facility]  Niesha Garcia M.D.  
47951  Z47.1



     [facility]      









 Z96.651









 Office Visit  2017 10:00a  [facility]  Amelia Saini MD  
50752  N64.59



     [facility]      

 

 Office Visit  2017 11:40a  [facility]  Qutaybeh S.  13902  I10



       ISABELA Garcia    









 I34.0

 

 I36.1









 Office Visit  2017  8:30a  [facility]  Niesha Garcia M.D.  
36506  M25.561



     [facility]      









 M25.562

 

 M25.461

 

 M25.462

 

 M17.0

 

 M16.0

 

 M25.551

 

 M25.552









 Office Visit  09/15/2017  2:00p  [facility]  Qutaybeh S. Maghaydah,  
65622  I10



       M.D.    









 I34.0

 

 I36.1

 

 I45.10









 Office Visit  2017  3:15p  [facility]  Velvet Hanson  50634  
B02.22



     [facility]    

 

 Office Visit  2017 10:00a  [facility]  Amelia Saini MD  
49946  N64.59



     [facility]      

 

 Office Visit  12/15/2016  9:30a  [facility]  Nurse Visit   97790  I10

 

 Office Visit  2016  4:20p  [facility]  Qutaybeh S.  88989  
I34.0



     Trae Garcia M.D.    









 I45.10

 

 I10

 

 I36.1

 

 E66.9









 Office Visit  11/10/2016  9:45a  [facility]  Velvet Hanson  52979  
B02.22



     [facility]    

 

 Office Visit  2016  9:45a  [facility]  Velvet Hanson  81628  
B02.22



     [facility]  M.D.    

 

 Office Visit  2016  1:40p  [facility]  Seantaybdora S.  47540  I34.0



       ISABELA Garcia    









 I45.2

 

 I10

 

 R94.31









 Office Visit  2015  3:40p  [facility]  Qutaybeh S. Maghaydah,  
93714  424.0



       M.D.    









 426.51

 

 401.9

 

 794.31









 Office Visit  2014  2:40p  [facility]  Qutaybeh S. Maghaydah,  
85007  424.0



       M.D.    









 426.51

 

 401.9

 

 794.31









 Office Visit  2013 10:20a  [facility]  Qutaybeh S. Maghaydah,  
72653  401.9



       M.D.    









 794.31

 

 424.0

 

 424.2

 

 426.51

 

 785.2









 Office Visit  2013  9:40a  [facility]  Qutaybeh S. Maghaydah,  
62222  401.9



       M.D.    









 794.31

 

 424.0

 

 424.2

 

 426.51









 Office Visit  2013  3:30p  [facility]  Qutaybeh S. Maghaydah,  
44671  401.9



       M.D.    









 794.31

 

 786.50









 Office Visit  2012  1:30p  [facility]  Lauren Valenzuela, N.P.  
07911  401.9

 

 Office Visit  04/10/2012 10:00a  [facility]  Lauren Valenzuela, N.P.  
31972  401.9

 

 Office Visit  2012 10:00a  [facility]  Qutaybeh S. Maghaydah,  
30606  401.9



       M.D.    









 424.0

 

 794.31

 

 424.2









 Office Visit  2011  8:15a  [facility]  Nurse Visit cc  27702  
401.9

 

 Office Visit  2011  3:40p  [facility]  Qutaybeh S. Maghaydah,  
90041  401.9



       M.D.    









 424.0

 

 794.31









 Office Visit  2009 10:30a  [facility]  Nurse Visit cc  88525  

 

 Office Visit  2009  9:20a  [facility]  Qutaybeh S. Maghaydah,  
74790  401.9



       M.D.    









 424.0

 

 424.2









 Office Visit  2009  2:20p  [facility]  Qutaybeh S. Maghaydah,  
34887  401.9



       M.D.    









 785.2

 

 424.0

 

 424.2

 

 780.4

 

 V72.81

 

 794.31







Plan of Care

Future Appointment(s):2018 10:45 am - Amelia Saini MD at [facility]10/ 11:45 am - Duy Farmer MD at [facility] 
[id number] 11:30 am - Niesha Garcia M.D. at [facility] 
[facility].10/26/2018  8:30 am - Niesha Garcia M.D. at [facility] 
[facility].10/19/2018 - Qutaybeh S. Maghaydah, M.D.R94.31 Abnormal electrocardiogram 
[ECG] [EKG]Z01.810 Encounter for preprocedural cardiovascular athphveakrpI90 
Essential (primary) hypertensionFollow up:9 months ovE66.9 Obesity, 
jdhjayzoiaaX99.10 Unspecified right bundle-branch nlycvZ28.0 Nonrheumatic 
mitral (valve) dnlfaximebavnD97.819 Aortic ectasia, unspecified siteNew Orders:
Echocardiogram

## 2018-11-08 NOTE — XMS REPORT
Abhi Vu

 Created on:2018



Patient:Abhi Vu

Sex:Female

:1950

External Reference #:[id number]





Demographics







 Address  [address]



   [place] [address]

 

 Home Phone  [phone]

 

 Mobile Phone  [phone]

 

 Email Address  [email or web address]

 

 Preferred Language  English

 

 Marital Status  Not  Or 

 

 Yazidi Affiliation  Unknown

 

 Race  White

 

 Ethnic Group  Not  Or 









Author







 Organization  [email or web address]

 

 Address  [address]



   [place] [address]

 

 Phone  [phone]









Support







 Name  Relationship  Address  Phone

 

 Amari Vu  Unavailable  Unavailable  [phone]









Care Team Providers







 Name  Role  Phone

 

 Celia Linn MD  Primary Care Physician  Unavailable









Payers







 Type  Date  Identification Numbers  Payment Provider  Subscriber

 

 Health Maintenance    Policy Number:  Medicare Blue Ppo  Abhi Vu



 [facility])    [id number]    









 Group Number: [id number]  [address] 

 

 PayID:   SHAYY Saldaña [id number]









 Medigap Part B  Effective:  Policy Number:  FRANKO Rodarte  Abhi WHITMORE Patricialucia



   2014  [id number]    









 Expires: 2015  PayID: [id number]  [facility] [id number]









 SHAY[place] Saldaña [id number]









 Medigap Part B  Effective:  Policy Number:  FRANKO Rodarte  Abhi WHITMORE Patricialucia



   2011  [id number]    









 Expires: 2013  PayID: [id number]  [place] [id number]









 [place]AY[place]da[place] [id number]







Problems







 Date  Description  Provider  Status

 

 Onset: 2012  Essential hypertension  Qutaybeh S. Maghaydah, Active M.D.  

 

 Onset: 2012  Mitral valve disorder  Qutaybeh S. Maghaydah, Active M.D.  

 

 Onset: 2012  Tricuspid valve disorder,  Qutaybeh S. Maghaydah,  Active



   non-rheumatic  M.DBhaskar  

 

 Onset: 2013  Electrocardiogram abnormal  Qutaybeh S. Maghaydah, Active M.D.  

 

 Onset: 2013  Chest pain  Qutaybeh S. Maghaydah,  Active



     M.D.  

 

 Onset: 2013  Right bundle branch block AND left  Qutaybeh S. Maghaydah,  
Active



   posterior fascicular block  M.DBhaskar  

 

 Onset: 01/10/2014  Heart murmur  [facility]  Active

 

 Onset: 2016  Post-herpetic trigeminal neuralgia  Velvet Hanson M.D.  
Active

 

 Onset: 2017  Localized, primary osteoarthritis  Niesha Garcia M.D.  
Active

 

 Onset: 2017  Localized, primary osteoarthritis  Niesha Garcia M.D.  
Active



   of the pelvic region and thigh    

 

 Onset: 2018  Arthroplasty of knee  Niesha Garcia M.D.  Active

 

 Onset: 2018  Aftercare following joint  Niesha Garcia M.D.  Active



   replacement surgery    

 

 Onset: 10/22/2018  Essential tremor  Duy Farmer MD  Active

 

 Onset: 10/22/2018  Trigeminal neuralgia  Duy Farmer MD  Active







Family History







 Date  Family Member(s)  Problem(s)  Comments

 

   General  Heart Disease  

 

   General  Hypertension  

 

   General  Cancer  

 

   Father   due to MI x3  () - age 51 yr

 

   Mother   due to Natural Causes  ()

 

   Paternal Grandfather   due to influenza  ()

 

   Paternal Grandmother   due to Influenza  ()

 

   Maternal Grandfather   due to Lung disease  ()

 

   Maternal Grandmother   due to drowned  ()







Social History







 Type  Date  Description  Comments

 

 Marital Status      

 

 Lives With    Spouse  

 

 Occupation    consultant  

 

 Cigarette Use    Never Smoked Cigarettes  

 

 ETOH Use    Rarely consumes alcohol  

 

 Smoking    Patient has never smoked  

 

 Recreational Drug Use    Denies Drug Use  

 

 Daily Caffeine    Consumes on average 2 cups of  and ice tea



     hot tea per day  

 

 Enjoy Exercising    Enjoys exercising  Walking, water aerobics

 

 Exercise Type/Frequency    Walks daily  36618 steps per day

 

 Exercise Type/Frequency    Swims 2 times a week  Aqua deonna







Allergies, Adverse Reactions, Alerts







 Date  Description  Reaction  Status  Severity  Comments

 

 2009  Ampicillin    active    hives

 

 2009  Codeine Phosphate    active    N/V

 

 2009  Hay Fever    active    

 

 2009  Oxcarbazepine    active    rash

 

 2009  Carbamazepine    active    rash

 

 2017  Amoxicillin    active    







Medications







 Medication  Date  Status  Form  Strength  Qnty  SIG  Indications  Ordering



                 Provider

 

 Amoxicillin    Active  Capsules  500mg  4caps  take 4              pills, 2    ISABELA Garcia



             g 1 hour    



             before    



             dental or    



             gi    



             procedure    

 

 Lyrica    Active  Capsules  50mg  30cap  1 tab by            s  mouth    MD Darian



             daily as    



             needed    

 

 Hydrochlorothiaz    Active  Tablets  25mg  90tab  take one  I10  Qutaybeh 
S.



         s  tablet by    Jose



             mouth    M.D.



             every day    

 

 Cymbalta    Active  Caps DR  30mg  60cap  take two  B02.22  oph    Part    s  capsules    ISABELA Hernandez



             by mouth    



             every day    

 

 Atenolol    Active  Tablets  25mg  30tab  1/2  po    Unknown



           s  qd    

 

 Lyrica    Active  Capsules  100mg  60cap  1 by    Lauren



   /        s  mouth    Barbara,



             daily in    M.D.



             the    



             evening    



             and one    



             in the    



             morning    

 

 Ibuprofen    Active    200mg    200    Unknown



             mg-400 mg    



             daily prn    

 

 Multi Vitamin    Active  Tablets      1 by    Unknown



   /          mouth    



             every day    

 

                 

 

 Tizanidine HCL    Hx  Capsules  4mg  60cap  take one            s  tab twice    ISABELA Garcia



   -          daily as    



             needed.    



                 

 

 Cyclobenzaprine    Hx  Tablets  10mg  60tab  take 1  Z47.1  Niesha



 HCL  /2018        s  tab by    ISABELA Garcia



   -          mouth 2-3    



             times           day as    



             needed    

 

 Metaxalone    Hx  Tablets  800mg  60tab  take one    Niesha        s  tab 2-3    ISABELA Garcia



   -          times a    



             day as    



   /2018          needed    

 

 Percocet    Hx  Tablets  5-325mg  90tab  1 -2 tabs    Niesha        s  by mouth    ISABELA Garcia



   -          every 4-6    



             hours as    



   /2018          needed    



             pain    

 

 Coumadin    Hx  Tablets  2mg  60tab  take 1    Niesha        s  tab by    ISABELA Garcia



   -          mouth    



   02/11          every    



   /2018          night at    



             bedtime    



             or as    



             directed    



             by md    



             through    



             visiting    



             nurse.    



             Do not    



             take    



             prior to    



             surgery.    

 

 Stool Softener    Hx  Capsules  100mg  90cap  1 tab po    Niesha        s  2-3 times    ISABELA Garcia



   -          daily    



             while on    



             narcotic    



             pain    



             medicine.    

 

 Lyrica    Hx  Capsules  100mg  60cap  one twice    Velvet HERRERA



           s  a day    Brian Hanson M.D.



                 

 

 Lyrica    Hx  Capsules  50mg  60cap  1 by    Velvet HERRERA



           s  mouth per    Margie,



   -          day as    M.DBhaskar



             needed in    



             addition    



             to 100mg    



             tabs    

 

 Hydrochlorothiaz    Hx  Tablets  25mg  90tab  1 po qd    Other



         s      Ordering



   -              Provider



                 

 

 Cozaar    Hx  Tablets  50mg  60tab  1 pill by    Kailey        s  mouth    Butcher,



   -          twice per    D.O.



   11/27          day    



   /2013              

 

 Lisinopril    Hx  Tablets  5mg  30tab  1 po qd    Qutaybeh S        Brian Jade M.D.



                 

 

 Norvasc    Hx  Tablets  10mg  90tab  1 po qd    Qutaybeh S        Brian Jade M.D.



                 

 

 Zyrtec    Hx  Chewtabs  10mg  30uni  1 tab po    Qutaybeh S        ts  qd    Brian Garica M.D.



                 

 

 Norvasc    Hx  Tablets  5mg  30tab  1 po qd    Qutaybeh S.



           Brian Jade M.D.



                 

 

 Lyrica    Hx  Capsules  150mg  60cap  1 po bid    Unknown



   /0000        s  and 1 qhs    



   -              



                 

 

 Multi Vitamin  00/  Hx  Tablets      1 po qd    Unknown



   /0000              



   -              



                 

 

 Fish Oil    Hx  Capsules  1000mg    1 po qd    Unknown



   /0000              



   -              



                 

 

 Lyrica  00  Hx  Capsules  150mg    1 po    Unknown



   /0000          daily in    



   -          the    



   12/29          morning    



   /2016              

 

 Cipro    Hx  Tablets  500mg  20tab  1 po qd x    Unknown



   /0000        s  3 days    



   -              



   04/10              



   /2012              

 

 Vitamin B6  00/  Hx  Tablets  250mg    2 po qd    Unknown



   /0000              



   -              



                 

 

 Magnesium  00/00  Hx        1 spray    Unknown



   /0000          qd on    



   -          abdomen    



                 

 

 Ibuprofen  00/  Hx  Tablets  400mg    by mouth    Unknown



   /0000          every 4    



   -          to 6    



             hours as    



   /2018          needed    

 

 Aspir-81  00  Hx  Tablets DR  81mg    1 by    Unknown



   /0000          mouth    



   -          every day    



                 

 

 Claritin    Hx  Capsules  10mg    1 by    Unknown



   /0000          mouth    



   -          every day    



             as needed    



                 

 

 Ciprofloxacin    Hx  Tablets  500mg        Husseini,



 HCL  /0000              MD García



   -              



   10/31              



   /2017              

 

 Aspirin  00  Hx    325mg        Unknown



   /0000              



   -              



   10/18              



   /2018              

 

 Duloxetine HCL    Hx  Caps DR  30mg    2 by    Unknown



   /0000    Part      mouth    



   -          every day    



                 

 

 Pneumococcal,Uns    Active  Injection          Unknown



 pecified  /              







Medications Administered in Office







 Medication  Date  Status  Form  Strength  Qnty  SIG  Indications  Ordering



                 Provider

 

 Depomedrol    Administered  Injection          Niesha



 40MG  Chucho Garcia M.D.

 

 Depomedrol    Administered  Injection          Niesha



 40MG  017              ISABELA Garcia







Vital Signs







 Date  Vital  Result  Comment

 

 10/26/2018  Height  62 inches  5'2"









 Weight  240.00 lb  

 

 BP Systolic  147 mmHg  

 

 BP Diastolic  78 mmHg  

 

 Respiratory Rate  17 /min  

 

 Pain Level  4  

 

 BMI (Body Mass Index)  43.9 kg/m2  









 10/22/2018  Height  62 inches  5'2"









 Weight  243.38 lb  

 

 Heart Rate  76 /min  

 

 BP Systolic Sitting  126 mmHg  

 

 BP Diastolic Sitting  80 mmHg  

 

 BMI (Body Mass Index)  44.5 kg/m2  









 10/19/2018  Height  62 inches  5'2"









 Weight  244.50 lb  with shoes

 

 Heart Rate  74 /min  

 

 BP Systolic  164 mmHg  

 

 BP Diastolic  88 mmHg  

 

 BMI (Body Mass Index)  44.7 kg/m2  

 

 Ejection Fraction  60-65%  ECHO. 2017  Height  62 inches  5'2"









 Weight  228.00 lb  

 

 BP Systolic  138 mmHg  

 

 BP Diastolic  84 mmHg  

 

 Body Temperature  98.1 F  

 

 BMI (Body Mass Index)  41.7 kg/m2  









 2018  Height  62 inches  5'2"









 Weight  225.00 lb  

 

 BP Systolic  144 mmHg  

 

 BP Diastolic  84 mmHg  

 

 Body Temperature  97.8 F  

 

 Pain Level  0  

 

 BMI (Body Mass Index)  41.1 kg/m2  









 2018  Height  63 inches  5'3"









 Weight  236.00 lb  per pt

 

 Heart Rate  64 /min  reg

 

 BP Systolic Sitting  144 mmHg  Lue

 

 BP Diastolic Sitting  90 mmHg  Lue

 

 Respiratory Rate  16 /min  

 

 Pain Level  2  right knee

 

 BMI (Body Mass Index)  41.8 kg/m2  









 2017  Height  63 inches  5'3"









 Weight  236.00 lb  

 

 Heart Rate  78 /min  

 

 BP Systolic Sitting  180 mmHg  

 

 BP Diastolic Sitting  90 mmHg  

 

 Body Temperature  98.5 F  

 

 BMI (Body Mass Index)  41.8 kg/m2  









 2017  Height  63 inches  5'3"









 Weight  228.00 lb  

 

 Heart Rate  72 /min  

 

 BP Systolic  138 mmHg  

 

 BP Diastolic  82 mmHg  

 

 Respiratory Rate  16 /min  

 

 Body Temperature  98.5 F  

 

 BMI (Body Mass Index)  40.4 kg/m2  









 2017  Height  63 inches  5'3"









 Weight  228.00 lb  with shoes

 

 Heart Rate  58 /min  

 

 BP Systolic Sitting  166 mmHg  LA lrg cuff

 

 BP Diastolic Sitting  104 mmHg  LA lrg cuff

 

 BMI (Body Mass Index)  40.4 kg/m2  

 

 Ejection Fraction  60%-65%  echo 01/15/13









 2017  Height  63 inches  5'3"









 Weight  225.00 lb  

 

 Heart Rate  61 /min  

 

 BP Systolic  147 mmHg  

 

 BP Diastolic  73 mmHg  

 

 BMI (Body Mass Index)  39.9 kg/m2  









 09/15/2017  Height  63 inches  5'3"









 Weight  227.00 lb  w/ shoes

 

 Heart Rate  66 /min  reg

 

 BP Systolic Sitting  150 mmHg  Rue, lg cuff

 

 BP Diastolic Sitting  94 mmHg  Rue, lg cuff

 

 Respiratory Rate  16 /min  

 

 BMI (Body Mass Index)  40.2 kg/m2  

 

 Ejection Fraction  60-65%  as of 2013 echo









 2017  Height  63 inches  5'3"









 Weight  230.00 lb  

 

 Heart Rate  68 /min  

 

 BP Systolic  156 mmHg  

 

 BP Diastolic  84 mmHg  

 

 Respiratory Rate  16 /min  

 

 Pain Level  2  

 

 BMI (Body Mass Index)  40.7 kg/m2  









 2017  Height  63 inches  5'3"









 Weight  217.00 lb  

 

 Heart Rate  66 /min  

 

 BP Systolic  142 mmHg  

 

 BP Diastolic  86 mmHg  

 

 Respiratory Rate  18 /min  

 

 Body Temperature  98.4 F  

 

 BMI (Body Mass Index)  38.4 kg/m2  









 12/15/2016  Heart Rate  61 /min  









 BP Systolic  132 mmHg  LA, large

 

 BP Diastolic  80 mmHg  LA, large

 

 BP Systolic Sitting  152 mmHg  LA, home unit

 

 BP Diastolic Sitting  78 mmHg  LA, home unit









 2016  Height  64 inches  5'4"









 Weight  222.00 lb  without shoes

 

 Heart Rate  76 /min  

 

 BP Systolic Sitting  160 mmHg  Rue reg cuff

 

 BP Diastolic Sitting  86 mmHg  Rue reg cuff

 

 BP Systolic Standing  156 mmHg  Rue reg cuff

 

 BP Diastolic Standing  80 mmHg  Rue reg cuff

 

 Respiratory Rate  17 /min  

 

 BMI (Body Mass Index)  38.1 kg/m2  









 11/10/2016  Height  64 inches  5'4"









 Weight  218.00 lb  

 

 Heart Rate  56 /min  

 

 BP Systolic Sitting  132 mmHg  

 

 BP Diastolic Sitting  86 mmHg  

 

 Respiratory Rate  14 /min  

 

 BMI (Body Mass Index)  37.4 kg/m2  









 2016  Height  64 inches  5'4"









 Weight  220.00 lb  

 

 Heart Rate  56 /min  

 

 BP Systolic Sitting  108 mmHg  

 

 BP Diastolic Sitting  70 mmHg  

 

 Respiratory Rate  14 /min  

 

 BMI (Body Mass Index)  37.8 kg/m2  









 2016  Height  64 inches  5'4"









 Weight  221.00 lb  w/o shoes

 

 Heart Rate  60 /min  

 

 BP Systolic Sitting  118 mmHg  LA lg cuff

 

 BP Diastolic Sitting  90 mmHg  LA lg cuff

 

 BMI (Body Mass Index)  37.9 kg/m2  

 

 Ejection Fraction  60-65  echo 1/15/13









 2015  Height  64 inches  5'4"









 Weight  237.00 lb  

 

 Heart Rate  70 /min  

 

 BP Systolic  146 mmHg  LA large

 

 BP Diastolic  78 mmHg  LA large

 

 BMI (Body Mass Index)  40.7 kg/m2  

 

 Ejection Fraction  60-65%  1/15/13 ECHO









 2014  Height  64 inches  5'4"









 Weight  240.25 lb  

 

 Heart Rate  64 /min  

 

 BP Systolic Sitting  144 mmHg  LA lg cuff

 

 BP Diastolic Sitting  82 mmHg  LA lg cuff

 

 Respiratory Rate  16 /min  

 

 BMI (Body Mass Index)  41.2 kg/m2  









 2013  Height  64 inches  5'4"









 Weight  228.00 lb  

 

 Heart Rate  68 /min  

 

 BP Systolic Sitting  130 mmHg  

 

 BP Diastolic Sitting  82 mmHg  

 

 Respiratory Rate  16 /min  

 

 BMI (Body Mass Index)  39.1 kg/m2  









 2013  Height  64 inches  5'4"









 Weight  247.00 lb  

 

 Heart Rate  58 /min  

 

 BP Systolic  118 mmHg  

 

 BP Diastolic  68 mmHg  

 

 BMI (Body Mass Index)  42.4 kg/m2  









 2012  Height  64 inches  5'4"









 Weight  260.00 lb  

 

 Heart Rate  70 /min  

 

 BP Systolic  128 mmHg  

 

 BP Diastolic  70 mmHg  

 

 BP Systolic Sitting  108 mmHg  pulse 80

 

 BP Diastolic Sitting  62 mmHg  pulse 80

 

 BP Systolic Standing  118 mmHg  pulse 84

 

 BP Diastolic Standing  70 mmHg  pulse 84

 

 BP Systolic Lying Down  116 mmHg  pulse 64

 

 BP Diastolic Lying Down  60 mmHg  pulse 64

 

 BMI (Body Mass Index)  44.6 kg/m2  









 04/10/2012  Height  64 inches  5'4"









 Weight  259.75 lb  

 

 Heart Rate  65 /min  

 

 BP Systolic Sitting  160 mmHg  home unit: 161/94

 

 BP Diastolic Sitting  80 mmHg  home unit: 161/94

 

 BMI (Body Mass Index)  44.6 kg/m2  









 2012  Height  64 inches  5'4"









 Weight  261.00 lb  

 

 Heart Rate  49 /min  

 

 BP Systolic Sitting  152 mmHg  

 

 BP Diastolic Sitting  90 mmHg  

 

 BMI (Body Mass Index)  44.8 kg/m2  









 2011  Height  64 inches  5'4"









 Weight  265.00 lb  

 

 Heart Rate  60 /min  

 

 BP Systolic Sitting  164 mmHg  L

 

 BP Diastolic Sitting  80 mmHg  L

 

 BMI (Body Mass Index)  45.5 kg/m2  









 2009  Height  64 inches  5'4"









 Weight  259.00 lb  

 

 Heart Rate  60 /min  

 

 BP Systolic Sitting  138 mmHg  

 

 BP Diastolic Sitting  82 mmHg  

 

 BMI (Body Mass Index)  44.5 kg/m2  









 2009  Height  64 inches  5'4"









 Weight  257.00 lb  

 

 Heart Rate  60 /min  

 

 BP Systolic Sitting  158 mmHg  L

 

 BP Diastolic Sitting  70 mmHg  L

 

 BMI (Body Mass Index)  44.1 kg/m2  









 2009  Height  64 inches  5'4"









 Weight  263.00 lb  

 

 Heart Rate  53 /min  

 

 BP Systolic Sitting  130 mmHg  

 

 BP Diastolic Sitting  80 mmHg  

 

 BP Systolic Standing  140 mmHg  

 

 BP Diastolic Standing  90 mmHg  

 

 BP Systolic Lying Down  130 mmHg  

 

 BP Diastolic Lying Down  80 mmHg  

 

 Respiratory Rate  16 /min  

 

 BMI (Body Mass Index)  45.1 kg/m2  







Results







 Test  Date  Test  Result  H/L  Range  Note

 

 Urinalysis Profile  2017  Urine Color  Yellow      1









 Urine Appearance  Clear      1

 

 Urine Specific Gravity  1.013    1.010-1.030  1

 

 Urine pH  6.0    5-9  1

 

 Urine Urobilinogen  Negative    Negative  1

 

 Urine Ketones  Negative    Negative  1

 

 Urine Protein  Negative    Negative  1

 

 Urine Leukocytes  Negative    Negative  1

 

 Urine Blood  Negative    Negative  1

 

 Urine Nitrite  Negative    Negative  1

 

 Urine Bilirubin  Negative    Negative  1

 

 Urine Glucose  Negative    Negative  1









 Comp Metabolic Panel  2017  Sodium  137 mmol/L    133-145  1









 Potassium  4.3 mmol/L    3.5-5.0  1

 

 Chloride  103 mmol/L    101-111  1

 

 Co2 Carbon Dioxide  29 mmol/L    22-32  1

 

 Anion Gap  5 mmol/L    2-11  1

 

 Glucose  91 mg/dL      1

 

 Blood Urea Nitrogen  19 mg/dL    6-24  1

 

 Creatinine  0.79 mg/dL    0.51-0.95  1

 

 BUN/Creatinine Ratio  24.1  High  8-20  1

 

 Calcium  9.2 mg/dL    8.6-10.3  1

 

 Total Protein  6.9 g/dL    6.4-8.9  1

 

 Albumin  4.2 g/dL    3.2-5.2  1

 

 Globulin  2.7 g/dL    2-4  1

 

 Albumin/Globulin Ratio  1.6    1-3  1

 

 Total Bilirubin  0.50 mg/dL    0.2-1.0  1

 

 Alkaline Phosphatase  88 U/L      1

 

 Alt  8 U/L    7-52  1

 

 Ast  17 U/L    13-39  1

 

 Egfr Non-  72.8    >60  1

 

 Egfr   93.6    >60  1, 2









 CBC No Diff  2017  White Blood Count  6.6 10^3/uL    3.5-10.8  1









 Red Blood Count  5.01 10^6/uL    4.0-5.4  1

 

 Hemoglobin  13.2 g/dL    12.0-16.0  1

 

 Hematocrit  40 %    35-47  1

 

 Mean Corpuscular Volume  80 fL    80-97  1

 

 Mean Corpuscular Hemoglobin  26 pg  Low  27-31  1

 

 Mean Corpuscular HGB Conc  33 g/dL    31-36  1

 

 Red Cell Distribution Width  15 %    10.5-15  1

 

 Platelet Count  252 10^3/uL    150-450  1

 

 Mean Platelet Volume  8 um3    7.4-10.4  1









 Inr/Protime  2017  Inr  0.88    0.77-1.02  1, 3

 

 Urine Culture And  2017  Urine Culture  SEE RESULT BELOW      1, 4



 Sensitivities            

 

 Type & Screen  2017  Patient Blood  B Positive      1



     Type        









 Antibody Screen  NEGATIVE      1









 Laboratory test finding  2017  Partial Thrombo Time  28.3 seconds    26.0
-36.3  1, 5



     PTT        

 

 Basic Metabolic Panel  2012  Sodium  136 mmol/L    135-145  









 Potassium  4.5 mmol/L    3.5-5.0  

 

 Chloride  106 mmol/L    101-111  

 

 Co2 (Carbon Dioxide)  24.0 mmol/L    22-32  

 

 Anion Gap  6.0 mmol/L    2-11  6

 

 Glucose  95 mg/dL      

 

 BUN  23 mg/dL    6-24  

 

 Creatinine  0.8 mg/dL    0.50-1.40  

 

 One Over Creatinine  1.25      

 

 BUN/Creatinine Ratio  28.8  High  8-20  

 

 Calcium  8.8 mg/dL    8.1-9.9  

 

 eGFR Non-  72.9    > 60  

 

 eGFR   93.8    > 60  7









 1  AA /2

 

 2  *******Because ethnic data is not always readily available,



   this report includes an eGFR for both -Americans and



   non- Americans.****



   The National Kidney Disease Education Program (NKDEP) does



   not endorse the use of the MDRD equation for patients that



   are not between the ages of 18 and 70, are pregnant, have



   extremes of body size, muscle mass, or nutritional status,



   or are non- or non-.



   According to the National Kidney Foundation, irrespective of



   diagnosis, the stage of the disease is based on the level of



   kidney function:



   Stage Description                      GFR(mL/min/1.73 m(2))



   1     Kidney damage with normal or decreased GFR       90



   2     Kidney damage with mild decrease in GFR          60-89



   3     Moderate decrease in GFR                         30-59



   4     Severe decrease in GFR                           15-29



   5     Kidney failure                       <15 (or dialysis)

 

 3  Please note the change in INR reference range effective



   17.

 

 4  SEE RESULT BELOW



   -----------------------------------------------------------------------------
---------------



   Name:  ABHI VU               : 1950    Attend Dr: Niesha Garcia MD



   Acct:  [id number]  Unit: [id number]  AGE: 67            Location:  [facility]



   Re17                        SEX: F             Status:    REG REF



   -----------------------------------------------------------------------------
---------------



   



   SPEC: [id number]         HALLEY:       SUBM DR: Niesha Garcia MD



   REQ:  [id number]              RECD:   



   STATUS: JESS JOSHI DR: Celia Linn MD



   _



   SOURCE: URINE          SPDESC:



   ORDERED:  Urine Culture



   COMMENTS: AA 



   QUERIES:  Urine Source: Clean Catch



   



   -----------------------------------------------------------------------------
---------------



   Procedure                         Result                         Reported   
        Site



   -----------------------------------------------------------------------------
---------------



   Urine Culture  Final                                             17-
1258      ML



   No Growth (<1,000 CFU/mL)



   



   -----------------------------------------------------------------------------
---------------



   * ML - MAIN LAB ([facility])



   .



   



   



   



   



   



   



   



   



   



   



   



   



   



   



   



   



   



   



   



   



   



   



   



   



   



   ** END OF REPORT **



   



   * ML=Testing performed at Main Lab



   DEPARTMENT OF PATHOLOGY,  [address] [place] [address]



   Phone # [phone]      Fax #[phone]



   Amari Christie M.D. Director     [facility] # [id number]

 

 5  AA 

 

 6  Anion gap measurement may be of limited value in the



   presence of any alkalosis, especially in a combined acid



   base disorder.



   .

 

 7  *******Because ethnic data is not always readily available,



   this report includes an eGFR for both -Americans and



   non- Americans.****



   The National Kidney Disease Education Program (NKDEP) does



   not endorse the use of the MDRD equation for patients that



   are not between the ages of 18 and 70, are pregnant, have



   extremes of body size, muscle mass, or nutritional status,



   or are non- or non-.



   According to the National Kidney Foundation, irrespective of



   diagnosis, the stage of the disease is based on the level of



   kidney function:



   Stage Description                      GFR(mL/min/1.73 m(2))



   1     Kidney damage with normal or decreased GFR       90



   2     Kidney damage with mild decrease in GFR          60-89



   3     Moderate decrease in GFR                         30-59



   4     Severe decrease in GFR                           15-29



   5     Kidney failure                       <15 (or dialysis)







Procedures







 Date  CPT Code  Description  Status

 

 10/19/2018  50973  EKG Tracing & Interpretation  Completed

 

 2018  38997  TKR Total Knee Replacement  Completed

 

 2018  74189  TKR Total Knee Replacement  Completed

 

 2017  36526  EKG, Interpretation Only  Completed

 

 2017  48441  ECHO Transthoracic, Real-Time 2D With Doppler And Color  
Completed



     Flow  

 

 2017  56434  ECHO Transthoracic, Real-Time 2D With Doppler And Color  
Completed



     Flow  

 

 11/10/2017    Mammogram  Completed

 

 2017  41599  Inject/Drain Joint/Bursa Major W/O US  Completed

 

 09/15/2017  69356  EKG Tracing & Interpretation  Completed

 

 2016  07917  EKG Tracing & Interpretation  Completed

 

 2016    Mammogram  Completed

 

 03/15/2016    Mammogram  Completed

 

 2016  98387  EKG Tracing & Interpretation  Completed

 

 2015    Mammogram  Completed

 

 2015  80466  EKG Tracing & Interpretation  Completed

 

 2015    Mammogram  Completed

 

 2015    Mammogram  Completed

 

 2015    Mammogram  Completed

 

 2014  76154  EKG Tracing & Interpretation  Completed

 

 01/10/2014  32508  ECHO Transthoracic, Real-Time 2D With Doppler And Color  
Completed



     Flow  

 

 2013  67327  EKG Tracing & Interpretation  Completed

 

 2013  10966  ECHO Transthoracic, Real-Time 2D With Doppler And Color  
Completed



     Flow  

 

 2013  86958  EKG Tracing & Interpretation  Completed

 

 01/15/2013  34979  ECHO Transthoracic, Real-Time 2D With Doppler And Color  
Completed



     Flow  

 

 2013  99031  ECHO Stress Test Incl Perf Contiuous ekg Monitoring  
Completed



     W/Phys Superv  

 

 2013  72451  ECHO Transthoracic, Real-Time 2D With Doppler And Color  
Completed



     Flow  

 

 2012  48257  EKG Tracing & Interpretation  Completed

 

 2012  58994  ECHO Transthoracic, Real-Time 2D With Doppler And Color  
Completed



     Flow  

 

 2011  07881  EKG Tracing & Interpretation  Completed

 

 10/04/2010  63487  ECHO Transthoracic, Real-Time 2D With Doppler And Color  
Completed



     Flow  

 

 10/06/2009  17251  ECHO Stress Test Incl Perf Contiuous ekg Monitoring  
Completed



     W/Phys Superv  

 

 10/06/2009  20439  ECHO Stress Test Incl Perf Contiuous ekg Monitoring  
Completed



     W/Phys Superv  

 

 2009  39447  ECHO Transthoracic, Real-Time 2D With Doppler And Color  
Completed



     Flow  

 

 2009  63987  EKG Tracing & Interpretation  Completed

 

 2009  48882  EKG, Interpretation Only  Completed

 

 2006  09940  Color Doppler  Completed

 

 2006  95155  Pulse Doppler & Continuous Wave  Completed

 

 2006  94355  Echocardiogram  Completed







Encounters







 Type  Date  Location  Provider  CPT E/M  Dx

 

 Office Visit  10/19/2018  2:20p  [facility]  [address]  [address]  R94.31



       ISABELA Garcia    









 Z01.810

 

 I10

 

 E66.9

 

 I45.10

 

 I34.0

 

 I77.819

 

 M17.12

 

 Z68.41









 Office Visit  2018 10:15a  [facility]  Niesha Garcia M.D.  
[address]78  Z47.1



     [facility]      









 Z96.651









 Office Visit  2017 10:00a  [facility]  Amelia Saini MD  
59899  N64.59



     Cma      

 

 Office Visit  2017 11:40a  [facility]  [address]  [address]  I10



       ISABELA Garcia    









 I34.0

 

 I36.1









 Office Visit  2017  8:30a  [facility]  Niesha Garcia M.D.  
57303  M25.561



     [facility]      









 M25.562

 

 M25.461

 

 M25.462

 

 M17.0

 

 M16.0

 

 M25.551

 

 M25.552









 Office Visit  09/15/2017  2:00p  [facility]  [address]askar Garcia  
[address]  I10



       M.D.    









 I34.0

 

 I36.1

 

 I45.10









 Office Visit  2017  3:15p  [facility]  Velvet Hanson,  [address]  
B02.22



     [facility]  M.DBhaskar    

 

 Office Visit  2017 10:00a  [facility]  Amelia Saini MD  
[address]  N64.59



     [facility]      

 

 Office Visit  12/15/2016  9:30a  [facility]  Nurse Visit   63339  I10

 

 Office Visit  2016  4:20p  [facility]  [address]  [address]  
I34.0



     Trae Garcia M.D.    









 I45.10

 

 I10

 

 I36.1

 

 E66.9









 Office Visit  11/10/2016  9:45a  [facility]  Velvet Hanson,  [address]956  
B02.22



     [facility]    

 

 Office Visit  2016  9:45a  [facility]  Velvet Hanson  [address]24  
B02.22



     [facility]  M.DBhaskar    

 

 Office Visit  2016  1:40p  [facility]  [address]  [address]  I34.0



       ISABELA Garcia    









 I45.2

 

 I10

 

 R94.31









 Office Visit  2015  3:40p  [facility]  [address] Maghaydah,  
[address]159  424.0



       M.D.    









 426.51

 

 401.9

 

 794.31









 Office Visit  2014  2:40p  [facility]  [address] Maghaydah,  
[address]49  424.0



       M.D.    









 426.51

 

 401.9

 

 794.31









 Office Visit  2013 10:20a  [facility]  [address] Maghay[place],  
[address]505  401.9



       M.D.    









 794.31

 

 424.0

 

 424.2

 

 426.51

 

 785.2









 Office Visit  2013  9:40a  [facility]  [address] Maghay[place],  
[address]79  401.9



       M.D.    









 794.31

 

 424.0

 

 424.2

 

 426.51









 Office Visit  2013  3:30p  [facility]  [address] [place]hay[place],  
[address]308  401.9



       M.D.    









 794.31

 

 786.50









 Office Visit  2012  1:30p  [facility]  Lauren Bianca, N.P.  
63716  401.9

 

 Office Visit  04/10/2012 10:00a  [facility]  Lauren Valenzuela, N.P.  
66014  401.9

 

 Office Visit  2012 10:00a  [facility]  [address] [place],  
[address]676  401.9



       M.D.    









 424.0

 

 794.31

 

 424.2









 Office Visit  2011  8:15a  [facility]  Nurse Visit cc  02561  
401.9

 

 Office Visit  2011  3:40p  [facility]  [address] haydah,  
88046  401.9



       M.D.    









 424.0

 

 794.31









 Office Visit  2009 10:30a  [facility]  Nurse Visit cc  10607  

 

 Office Visit  2009  9:20a  [facility]  [address] Johnath[place]ran[place],  
[address]999  401.9



       M.D.    









 424.0

 

 424.2









 Office Visit  2009  2:20p  [facility]  [address] [place]ath[place],  
[address]05  401.9



       M.D.    









 785.2

 

 424.0

 

 424.2

 

 780.4

 

 V72.81

 

 794.31







Plan of Care

Future Appointment(s):2018  9:45 am - [facility] M[facility]D. at [facility]10/22/2019 10:30 am - Duy Farmer MD at [facility] 
[id number] 10:45 am - Amelia Saini MD at [facility]2018 11:30 am - Niesha Garcia M.D. at [facility]10/26/
2018 - Niesha Garcia M.D.M17.12 Unilateral primary osteoarthritis, left 
kneeFollow up:Follow up:   2 weeks after zsadxboS69.562 Pain in left 
kneeM25.462 Effusion, left knee

## 2018-11-09 VITALS — SYSTOLIC BLOOD PRESSURE: 144 MMHG | DIASTOLIC BLOOD PRESSURE: 64 MMHG

## 2018-11-09 LAB
HCT VFR BLD AUTO: 34 % (ref 35–47)
HGB BLD-MCNC: 11.1 G/DL (ref 12–16)
INR PPP/BLD: 0.95 (ref 0.77–1.02)
PLATELET # BLD AUTO: 209 10^3/UL (ref 150–450)

## 2018-11-09 RX ADMIN — OXYCODONE HYDROCHLORIDE AND ACETAMINOPHEN PRN TAB: 5; 325 TABLET ORAL at 12:38

## 2018-11-09 RX ADMIN — ACETAMINOPHEN SCH: 325 TABLET ORAL at 02:06

## 2018-11-09 RX ADMIN — OXYCODONE HYDROCHLORIDE AND ACETAMINOPHEN PRN TAB: 5; 325 TABLET ORAL at 05:00

## 2018-11-09 RX ADMIN — MAGNESIUM HYDROXIDE SCH: 400 SUSPENSION ORAL at 10:26

## 2018-11-09 RX ADMIN — OXYCODONE HYDROCHLORIDE AND ACETAMINOPHEN PRN TAB: 5; 325 TABLET ORAL at 15:59

## 2018-11-09 RX ADMIN — CEFAZOLIN SODIUM SCH MLS/HR: 1 SOLUTION INTRAVENOUS at 05:01

## 2018-11-09 RX ADMIN — ACETAMINOPHEN SCH: 325 TABLET ORAL at 10:26

## 2018-11-09 RX ADMIN — CEFAZOLIN SODIUM SCH MLS/HR: 1 SOLUTION INTRAVENOUS at 13:57

## 2018-11-09 RX ADMIN — PREGABALIN SCH MG: 100 CAPSULE ORAL at 08:17

## 2018-11-09 RX ADMIN — OXYCODONE HYDROCHLORIDE AND ACETAMINOPHEN PRN TAB: 5; 325 TABLET ORAL at 00:30

## 2018-11-09 RX ADMIN — OXYCODONE HYDROCHLORIDE AND ACETAMINOPHEN PRN TAB: 5; 325 TABLET ORAL at 08:18

## 2018-11-09 RX ADMIN — DOCUSATE SODIUM SCH MG: 100 CAPSULE, LIQUID FILLED ORAL at 08:18

## 2018-11-09 NOTE — OP
OPERATIVE REPORT:

 

DATE OF OPERATION:  11/08/18

 

DATE OF BIRTH:  [birth year]

 

SURGEON:  Niesha Garcia MD

 

ASSISTANT:  BRIAN Rao Ms. did help throughout the procedure with preparation of the leg, wound retraction, manipulat
ion of the knee, and wound closure.

 

ANESTHESIOLOGIST:  Dr. Eugene.

 

ANESTHESIA:  Spinal.

 

PRE-OP DIAGNOSIS:  Severe end-stage degenerative osteoarthritis of the left knee joint.

 

POST-OP DIAGNOSIS:  Severe end-stage degenerative osteoarthritis of the left knee joint.

 

OPERATIVE PROCEDURE:  Left total knee arthroplasty.

 

TOURNIQUET TIME:  44 minutes.

 

ESTIMATED BLOOD LOSS:  200 cc.

 

COMPLICATIONS:  None.

 

SPECIMEN:  Bone and cartilage from the left knee joint sent to Pathology.

 

HARDWARE USED:  This is cemented Smith and Nephew total knee arthroplasty hardware. Two packages of S
implex bone cement.  For the femur, a size 5 left Oxinium femoral component Legion type.  For the bas
e plate, a 3 left Miranda II tibial base plate. For the insert, a 9-mm posterior stabilized articular
 insert, size 3-4 and for the patella, a 32-mm 3-peg all poly patella.

 

BRIEF HISTORY AND INDICATIONS:  Ms. Neff is a 68-year-old female with years of increasingly sev
ere left knee pain and deformity.  She failed conservative treatment with antiinflammatories, pain me
dication, intraarticular injection, and physical therapy.  Radiographs showed bone-on-bone arthritis.
  Due to continued pain and decreased quality of life, she elected to undergo left total knee arthrop
lasty.  Informed consent was obtained from the patient.  She understood the risks of surgery included
, but were not limited to bleeding, infection, damage to nearby structures, continued pain, need for 
further surgery, intraoperative fracture, nerve palsy, hardware failure or loosening, knee stiffness,
 loss of motion, stroke, heart attack, blood clot, and death.

 

INTRAOPERATIVE FINDINGS:  Intraoperatively, the patient was noted to have extreme loss of cartilage a
long the medial and lateral compartments as well as the patellofemoral compartment.  She had some bon
y deformation of the medial tibial plateau due to chronic wear.  She had extensive osteophyte formati
on.

 

DESCRIPTION OF PROCEDURE:  Ms. Neff was identified in the preanesthesia unit. Her left lower ex
tremity was marked as the correct operative side.  Informed consent was signed and placed in the panda
t.  The patient was taken to the operating room and placed under spinal anesthesia.  A Noguera catheter
 was placed.  Tourniquet was placed on the left thigh.  Left lower extremity was prepped and draped i
n the usual sterile fashion.  Preop time-out was made to correctly identify the patient, side and sit
e.  Appropriate perioperative antibiotics were given within 1 hour of incision.

 

A midline incision was made with a 10 blade and carried down to the extensor mechanism.  A new 10 christopher
de was used to make a standard medial parapatellar arthrotomy.  Patella was subluxed laterally.  Elec
trocautery was used to subperiosteally elevate the soft tissue off the superomedial tibia to the mid 
sagittal plane.  The knee was flexed up.  The anterior horn of the lateral meniscus and ACL were alessandro
ply released.  A drill was used to enter the distal femur. Intramedullary distal femoral cutting guid
e was pinned on the distal femur. Oscillating saw was used to make the distal femoral cut.  External 
rotation guide was pinned on the distal femur and the distal femur was sized to a size 5.  Size 5 mul
ti-cutting jig was pinned on the distal femur.  Oscillating saw was used to make the appropriate 4 ch
amfer cuts.

 

PCL was completely released.  The tibia was subluxed anteriorly.  Extramedullary tibial cutting guide
 was pinned on the proximal tibia.  Oscillating saw was used to make the proximal tibial cut perpendi
cular to the mechanical axis of the tibia. The bone was carefully removed.  The knee was brought out 
into full extension.  The spacer block had excellent fit with the knee in full extension.  Medial and
 lateral ligaments were well balanced.  The flexion and extension gaps were well balanced. The knee w
as flexed up.  The lamina  was placed both medially and laterally.  Any remaining meniscus wa
s carefully removed using electrocautery. Curved osteotome was used to remove any posterior osteophyt
es.

 

Tibial tray and drop vicki were placed and once again confirmed a satisfactory tibial cut.  A left size
 5 femoral trial was impacted on to the distal femur and had good fit and stability.  The box for the
 posterior stabilized implant was prepared using a reamer and box cut osteotome.  A size 3 tibial tra
y trial with a 9-mm insert trial was placed and the knee was taken through a range of motion.  The kn
ee had full extension to 125 degrees of flexion.  All trials were carefully removed.  The tibia was s
ubluxed anteriorly and sized to a size 3.  Proximal tibia was prepared using a size 3 keel punch.  Al
l bony cut surfaces were copiously irrigated with sterile saline and dried.  Final implants were ceme
nted into place starting with the tibia followed by the femur and lastly the patella.  A 9-mm insert 
trial was placed and the knee was brought out into full extension.  The tourniquet was turned down at
 44 minutes.

 

Electrocautery was used to obtain meticulous hemostasis.  The knee was copiously irrigated with steri
le saline.  Once the cement had fully cured, the insert trial was removed.  Any excess cement was rem
jose a from around the capsule and hardware. Final insert chosen was a 9-mm posterior stabilized articu
lar insert, size 3-4. This was locked into position on the tibial tray.  Stability of the insert was 
checked and rechecked and noted to be stable.  The knee was copiously irrigated with sterile saline. 
 Extensor mechanism was closed using interrupted #1 Vicryl. The rest of the incisions were closed in 
a layered fashion using 0 and 2-0 Vicryl. The skin was closed using running 3-0 nylon suture.  Steril
e Xeroform, 4x4's, and Webril were used to cover the incision.  Ace pack and cold wrap were placed ov
er this.  The patient's anesthesia was reversed without difficulty.  She was taken to the PACU in sta
ble condition.  Intended weightbearing will be weightbearing as tolerated.  Intended DVT prophylaxis 
will be Coumadin with a Lovenox bridge.

 

 [id number]/[facility] #: [id number]

## 2018-11-09 NOTE — PN
Progress Note





- Progress Note


Date of Service: 11/09/18


SOAP: 


Subjective:


[]Patient seen at bedside, doing very well.  Mastered all PT/OT goals and her 

pain is well managed.  She is hoping to go home after therapy this afternoon. 








Objective:


[]


 Vital Signs











Temp  98.2 F   11/09/18 08:15


 


Pulse  63   11/09/18 08:15


 


Resp  16   11/09/18 08:18


 


BP  173/71   11/09/18 08:15


 


Pulse Ox  96   11/09/18 08:15








 Intake & Output











 11/08/18 11/09/18 11/09/18





 18:59 06:59 18:59


 


Intake Total 1500 3720 480


 


Output Total 550 4150 600


 


Balance 950 -430 -120


 


Weight 234 lb  


 


Intake:   


 


  IV Fluids 1500 980 


 


    LR  980 


 


    lr 1500  


 


  Oral  2740 480


 


Output:   


 


  Urine   600


 


  Noguera 450 4150 


 


  Estimated Blood Loss 100  


 


Other:   


 


  Estimated Void   Medium


 


  # Bowel Movements  0 


 


  Estimated Stool Amount   Medium


 


  # Voids   1








 Vital Signs











Temp Pulse Resp BP Pulse Ox


 


 98.2 F   63   16   173/71   96 


 


 11/09/18 08:15  11/09/18 08:15  11/09/18 08:18  11/09/18 08:15  11/09/18 08:15








Left knee dressings changed


wound benign


calf NT and soft


+DF/PF left ankle


sensation intact distally 








Assessment:


[]s/p Left total knee arthroplasty POD #1








Plan:


[]PT OT this afternoon


  WBAT


  Change coumadin to  mg BID


  Discharge home today after PT


  Follow up with Dr. Garcia in 10-14 days as scheduled.

## 2018-11-10 NOTE — DS
*** AMENDED REPORT NOW INCLUDES COSIGNER DESIGNATION ***



DISCHARGE SUMMARY:

 

DATE OF ADMISSION:  11/08/18

 

DATE OF DISCHARGE:  11/09/18

 

ATTENDING PHYSICIAN:  Dr. Niesha Garcia.* (DICTATED BY BRIAN TREVIÑO)

 

ADMISSION DIAGNOSIS:  Osteoarthritis, left knee.

 

DISCHARGE DIAGNOSIS:  Osteoarthritis, left knee.

 

SURGERY PERFORMED:  Left total knee arthroplasty.

 

HOSPITAL COURSE:  The patient is a 68-year-old female who had increasing pain 
and decreased activities of daily living due to end-stage osteoarthritis of her 
left knee joint.  The patient failed conservative management and elected to 
proceed with left total knee arthroplasty.  She was taken to the operating room 
under the care of Dr. Niesha Garcia on the date of 11/08/18 for the 
aforementioned procedure.  She tolerated the procedure well and left the 
operating room in stable condition. Postoperatively, she did very well with her 
physical therapy and occupational therapy goals, mastering all of her goals on 
postoperative day #1.  Her pain was under excellent control.  It was felt she 
was stable medically and orthopedically for discharge to home in the afternoon 
of 11/09/18 after her afternoon physical therapy session.

 

CONDITION ON DISCHARGE:  The patient is afebrile.  Her vital signs are stable.  
Her left knee dressings were changed.  Her incision is benign without drainage 
or erythema.  Her calf is soft and nontender.  She has active dorsiflexion of 
the left ankle.  Her sensation and circulation are intact distally.

 

PLAN:  Discharge to home, bearing weight as tolerated on the left lower 
extremity. The patient's Coumadin will be discontinued and she will switch over 
to Ecotrin 325 mg p.o. twice daily starting on Saturday 11/10/18 to continue 
for 30 days postoperatively.  The patient states that she has pain medication, 
muscle relaxant at home to take.  Therefore, no new prescriptions were provided 
at the time of her discharge.  She will follow up as scheduled in the office 
with Dr. Garcia in roughly 10 to 14 days.  All questions were answered prior to 
her discharge today.

 

____________________________________ BRIAN TREVIÑO

 

[id number]/[facility] #: [id number]

MTDD

## 2019-11-20 ENCOUNTER — HOSPITAL ENCOUNTER (OUTPATIENT)
Dept: HOSPITAL 25 - OREAST | Age: 69
Discharge: HOME | End: 2019-11-20
Attending: SPECIALIST
Payer: MEDICARE

## 2019-11-20 VITALS — SYSTOLIC BLOOD PRESSURE: 148 MMHG | DIASTOLIC BLOOD PRESSURE: 88 MMHG

## 2019-11-20 DIAGNOSIS — K21.9: ICD-10-CM

## 2019-11-20 DIAGNOSIS — I10: ICD-10-CM

## 2019-11-20 DIAGNOSIS — H25.812: Primary | ICD-10-CM

## 2019-11-20 DIAGNOSIS — R01.1: ICD-10-CM

## 2019-11-20 DIAGNOSIS — J45.990: ICD-10-CM

## 2019-11-20 PROCEDURE — V2632 POST CHMBR INTRAOCULAR LENS: HCPCS

## 2019-11-20 NOTE — OP
DATE OF OPERATION:  11/20/19 [facility]

 

DATE OF BIRTH:  [birth year]

 

SURGEON:  Killian Cerna M.D.

 

PREOPERATIVE DIAGNOSIS:  Cataract, left eye.

 

POSTOPERATIVE DIAGNOSIS:  Cataract, left eye.

 

OPERATIVE PROCEDURE:  Extracapsular cataract extraction with intraocular lens 
implant left eye.

 

DESCRIPTION OF PROCEDURE:  The patient was brought to the operating room after 
being given 1/2% Alcaine with epinephrine drops in the preoperative area.  The 
eye was prepped and draped in the usual sterile fashion.  Sterile drape and 
eyelid speculum were placed.  Again, topical 1/2% Alcaine with epinephrine was 
given.  A paracentesis incision was made at the 3 o'clock position with the 
No.75 blade.  Clear cornea incision 2.2 x 2.2-mm was created at the 6 o'clock 
position starting at the anterior limbus using the 2.2-mm keratome.  The 
anterior chamber was irrigated with 0.4 mL of 1% non-preservative intracameral 
lidocaine and filled with DisCoVisc.  A capsulorrhexis was completed using the 
cystotome and the Utrata forceps. Hydrodissection was performed with balanced 
salt solution.  The lens nucleus was removed with the Phacoemulsification 
handpiece without incident.  Cortex was removed with the irrigation-aspiration 
handpiece.  The capsular bag was re-inflated using DisCoVisc and an [id number] 
implant was inserted with the shooter.  The irrigation-aspiration handpiece was 
used to remove all residual DisCoVisc. The eye was refilled with balanced salt 
solution and the wound checked and found to be watertight.  Topical Maxitrol 
drops were given.

 

 [id number]/[facility] #: [id number]

[facility]

## 2020-04-24 ENCOUNTER — HOSPITAL ENCOUNTER (OUTPATIENT)
Dept: HOSPITAL 25 - OR | Age: 70
Discharge: HOME | End: 2020-04-24
Attending: UROLOGY
Payer: MEDICARE

## 2020-04-24 VITALS — SYSTOLIC BLOOD PRESSURE: 147 MMHG | DIASTOLIC BLOOD PRESSURE: 78 MMHG
